# Patient Record
Sex: MALE | Race: WHITE | Employment: UNEMPLOYED | URBAN - METROPOLITAN AREA
[De-identification: names, ages, dates, MRNs, and addresses within clinical notes are randomized per-mention and may not be internally consistent; named-entity substitution may affect disease eponyms.]

---

## 2018-04-26 ENCOUNTER — OFFICE VISIT (OUTPATIENT)
Dept: FAMILY MEDICINE CLINIC | Age: 53
End: 2018-04-26

## 2018-04-26 VITALS
RESPIRATION RATE: 16 BRPM | SYSTOLIC BLOOD PRESSURE: 130 MMHG | BODY MASS INDEX: 28.17 KG/M2 | DIASTOLIC BLOOD PRESSURE: 76 MMHG | TEMPERATURE: 97.4 F | OXYGEN SATURATION: 98 % | WEIGHT: 208 LBS | HEART RATE: 91 BPM | HEIGHT: 72 IN

## 2018-04-26 DIAGNOSIS — L90.5 SCAR OF FOOT: ICD-10-CM

## 2018-04-26 DIAGNOSIS — Z76.89 ENCOUNTER TO ESTABLISH CARE: Primary | ICD-10-CM

## 2018-04-26 DIAGNOSIS — Z00.00 LABORATORY EXAM ORDERED AS PART OF ROUTINE GENERAL MEDICAL EXAMINATION: ICD-10-CM

## 2018-04-26 RX ORDER — TRIAMCINOLONE ACETONIDE 55 UG/1
1 SPRAY, METERED NASAL
COMMUNITY
End: 2022-10-06 | Stop reason: ALTCHOICE

## 2018-04-26 NOTE — PROGRESS NOTES
S: Berkley Hunt is a 47 y.o. male who presents for skin/rash    Assessment/Plan:  1. Encounter to establish care  -will schedule CPE, labs ordered today: lipid, CBC w/diff, CMP, A1c, U/A    2. 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 - had infection at site in December, given abx and cleared upl but has 3 areas of hyperpigmentation   -does have LE edema and wears compression sock daily   -will  pull lab work - CBC w/diff, A1c, VIt D, CMP, U/A - pt to schedule CPE to discuss results    RTC for CPE, discuss lab results       HPI:  1990 - Had giant cell - size of golf ball removed from left ankle  2014 - had a blood clot in left leg from hip to knee - Surgery done [place], [place]  Has restless leg syndrome from that surgery     Old scar was infected in December 2017 - given abx clindamycin 300mg bid x 10days  Infection cleared up   No pain, no itching  Skin discolored - pt concerned about hyperpigmentation as sign of poor healing  No Bites:     Has DM in family - discussed reduced healing in DM  Pt states he does test his BS at home  -100-155 after meals     Social History:  Social: Moved form [place] in January 2018 - mom has dementia and he moved here to take care of her  Occupation: [employer]   Tobacco: none      Review of Systems:  - Constitutional Symptoms: no fatigue  - Cardiovascular: no chest pain or palpitations  - Respiratory: no cough or shortness of breath  - Gastrointestinal: no dysphagia or abdominal pain    I reviewed the following:  History reviewed. No pertinent past medical history. Current Outpatient Prescriptions   Medication Sig Dispense Refill    triamcinolone (NASACORT AQ) 55 mcg nasal inhaler 1 Spray.  acetaminophen-caff-pyrilamine 500-60-15 mg tab Take  by mouth.          Allergies   Allergen Reactions    Amitriptyline Rash    Levofloxacin Shortness of Breath and Swelling    Mold Itching         O: VS:   Visit Vitals    /76 (BP 1 Location: Left arm, BP Patient Position: Sitting)    Pulse 91    Temp 97.4 °F (36.3 °C) (Oral)    Resp 16    Ht 6' (1.829 m)    Wt 208 lb (94.3 kg)    SpO2 98%    BMI 28.21 kg/m2       GENERAL: Rosa Wilkes is in no acute distress. Non-toxic. Well nourished. Well developed. Appropriately groomed. HEAD:  Normocephalic. Atraumatic. Non tender sinuses x 4. RESP: Breath sounds are symmetrical bilaterally. Unlabored without SOB. Speaking in full sentences. Clear to auscultation bilaterally anteriorly and posteriorly. No wheezes. No rales or rhonchi. CV: normal rate. Regular rhythm. S1, S2 audible. No murmur noted. No rubs, clicks or gallops noted. SKIN: Skin is warm and dry. Turgor is normal. No cyanosis. No jaundice or pallor. Left Ankle: hyperpigmentation of scarred area noted, no discharge, fluctuance or edema or erythema noted. PSYCH: appropriate behavior, dress and thought processes. Good eye contact. Clear and coherent speech. Full affect. Good insight.         ______________________________________________________________________  Patient education was done. Advised on nutrition, tobacco, and alcohol. Counseling included discussion of diagnosis, differentials, treatment options, prescribed treatment, warning signs and follow up. Medication risks/benefits, interactions and alternatives discussed with patient.      Patient verbalized understanding and agreed to plan of care.     Follow up - schedule CPE and discuss labs

## 2018-04-26 NOTE — PROGRESS NOTES
Sarah Kin  Identified pt with two pt identifiers(name and ). Chief Complaint   Patient presents with    New Patient     Establish Patient        1. Have you been to the ER, urgent care clinic since your last visit? Hospitalized since your last visit? NO    2. Have you seen or consulted any other health care providers outside of the [address] [place] [address] since your last visit? Include any pap smears or colon screening. NO    Today's provider has been notified of reason for visit, vitals and flowsheets obtained on patients. Patient received paperwork for advance directive during previous visit but has not completed at this time     Reviewed record In preparation for visit, huddled with provider and have obtained necessary documentation      Health Maintenance Due   Topic    DTaP/Tdap/Td series (1 - Tdap)    FOBT Q 1 YEAR AGE 54-65     Influenza Age 5 to Adult        Wt Readings from Last 3 Encounters:   18 208 lb (94.3 kg)     Temp Readings from Last 3 Encounters:   18 97.4 °F (36.3 °C) (Oral)     BP Readings from Last 3 Encounters:   18 130/76     Pulse Readings from Last 3 Encounters:   18 91     Vitals:    18 1518   BP: 130/76   Pulse: 91   Resp: 16   Temp: 97.4 °F (36.3 °C)   TempSrc: Oral   SpO2: 98%   Weight: 208 lb (94.3 kg)   Height: 6' (1.829 m)   PainSc:   0 - No pain         Learning Assessment:  :     Learning Assessment 2018   PRIMARY LEARNER Patient   HIGHEST LEVEL OF EDUCATION - PRIMARY LEARNER  SOME COLLEGE   PRIMARY LANGUAGE ENGLISH   LEARNER PREFERENCE PRIMARY READING   ANSWERED BY self   RELATIONSHIP SELF       Depression Screening:  :     PHQ over the last two weeks 2018   Little interest or pleasure in doing things Not at all   Feeling down, depressed or hopeless Not at all   Total Score PHQ 2 0       Fall Risk Assessment:  :     No flowsheet data found. Abuse Screening:  :     No flowsheet data found.     ADL Screening:  :     ADL Assessment 4/26/2018   Feeding yourself No Help Needed   Getting from bed to chair No Help Needed   Getting dressed No Help Needed   Bathing or showering No Help Needed   Walk across the room (includes cane/walker) No Help Needed   Using the telphone No Help Needed   Taking your medications No Help Needed   Preparing meals No Help Needed   Managing money (expenses/bills) No Help Needed   Moderately strenuous housework (laundry) No Help Needed   Shopping for personal items (toiletries/medicines) No Help Needed   Shopping for groceries No Help Needed   Driving No Help Needed   Climbing a flight of stairs No Help Needed   Getting to places beyond walking distances No Help Needed                 Medication reconciliation up to date and corrected with patient at this time.

## 2018-04-26 NOTE — MR AVS SNAPSHOT
[address] [place] 
[address] [place] [address] 
[phone] Patient: Lucrecia Lopez MRN: [id number] :1965 Visit Information Date & Time Provider Department Dept. Phone Encounter #  
 2018  3:40 PM Luke Giang NP [facility] [phone] [id number] Upcoming Health Maintenance Date Due DTaP/Tdap/Td series (1 - Tdap) 10/17/1987 FOBT Q 1 YEAR AGE 50-75 10/17/2016 Influenza Age 5 to Adult 2017 Allergies as of 2018  Review Complete On: 2018 By: Luke Giang NP Severity Noted Reaction Type Reactions Amitriptyline  2017    Rash Levofloxacin  2017    Shortness of Breath, Swelling Mold  2018    Itching Current Immunizations  Never Reviewed No immunizations on file. Not reviewed this visit You Were Diagnosed With   
  
 Codes Comments Laboratory exam ordered as part of routine general medical examination    -  Primary ICD-10-CM: Z00.00 ICD-9-CM: V72.62 Encounter to establish care     ICD-10-CM: Z76.89 
ICD-9-CM: V65.8 Vitals BP Pulse Temp Resp Height(growth percentile) Weight(growth percentile) 130/76 (BP 1 Location: Left arm, BP Patient Position: Sitting) 91 97.4 °F (36.3 °C) (Oral) 16 6' (1.829 m) 208 lb (94.3 kg) SpO2 BMI Smoking Status 98% 28.21 kg/m2 Never Smoker BMI and BSA Data Body Mass Index Body Surface Area  
 28.21 kg/m 2 2.19 m 2 Preferred Pharmacy Pharmacy Name Phone [phone] [phone] Your Updated Medication List  
  
   
This list is accurate as of 18  4:08 PM.  Always use your most recent med list.  
  
  
  
  
 acetaminophen-caff-pyrilamine 500-60-15 mg Tab Take  by mouth.  
  
 triamcinolone 55 mcg nasal inhaler Commonly known as:  NASACORT AQ  
1 [place]. We Performed the Following CBC WITH AUTOMATED DIFF [27580 CPT(R)] HEMOGLOBIN A1C WITH EAG [10828 CPT(R)] LIPID PANEL [57176 CPT(R)] METABOLIC PANEL, COMPREHENSIVE [01991 CPT(R)] UA/M W/RFLX CULTURE, ROUTINE [[id number] Custom] Patient Instructions 1) Labs The following blood work was ordered today. Once the tests are completed, you will receive a letter, email or phone call with the results. If you have not heard from us within 10-14 days, please call the office for the results. Complete Blood Count (CBC) helps evaluate your overall health, including hemoglobin and red blood cells that carry oxygen, white blood cells that fight infection and platelets that help with clotting. Complete Metabolic Panel (CMP) assesses electrolytes, kidney and liver function.  (A Basic Metabolic Panel (BMP) does not include liver function.) Electrolytes include sodium, potassium, calcium, and chloride. These are necessary for cell function and an imbalance can cause serious problems. BUN and creatinine are markers of kidney function. ALT and AST are markers of liver function. Hemoglobin A1c is a 3 month average of your blood sugar and used as a marker of your diabetes control. A normal value is less than 5.7%. Total Cholesterol is the total number of cholesterol particles in your blood, which includes triglycerides, HDL and LDL. A small amount of cholesterol is needed for the body's cells, hormones, and metabolism. Goal is less than 200. Triglycerides are the short term fats in your blood which are used for energy. Goal is less than 150. High Density Lipids (HDL) is the \"good\" cholesterol in your blood. HDL helps remove bad cholesterol from your blood. Goal is more than 40. Low Density Lipids (LDL) is the \"bad\" cholesterol in your blood. LDL can stick to your arteries, raising the risk for heart attack and stroke. Goal is less than 100 Urinalysis - this is a test of your urine to check your overall health. It is recommended as a part of a routine check up and screens for a variety of disorders, such as urinary tract infections, kidney disease and diabetes. 2) Please check your blood pressure through the week at staggered times in the day. (If you check in the morning, it should be at least one hour after your morning blood pressure medications.)  Arm monitors are most accurate. If you use a wrist monitor, make sure your wrist is at heart level. You can bring your home monitor to your next visit and have it calibrated with the machine in the office to gauge your readings. Keep a written record of your blood pressure readings and bring it to each appointment. If your systolic blood pressure is consistently greater than 140mmHg or less than 100mmHg then please schedule an office meeting. Cardiac symptoms that would need immediate attention include: uncomfortable pressure, squeezing, fullness or pain in the center of your chest. Pain or discomfort in one or both arms, the back, neck, jaw or stomach. Shortness of breath with or without chest discomfort, breaking out in a cold sweat, nausea or lightheadedness. Introducing [facility]! [employer] introduces [employer] patient portal. Now you can access parts of your medical record, email your doctor's office, and request medication refills online. 1. In your internet browser, go to [email or web address] 2. Click on the First Time User? Click Here link in the Sign In box. You will see the New Member Sign Up page. 3. Enter your [employer] Access Code exactly as it appears below. You will not need to use this code after youve completed the sign-up process. If you do not sign up before the expiration date, you must request a new code. · [employer] Access Code: [email or web address] Expires: 7/25/2018  4:08 PM 
 
 4. Enter the last four digits of your Social Security Number (xxxx) and Date of Birth (mm/dd/yyyy) as indicated and click Submit. You will be taken to the next sign-up page. 5. Create a [employer] ID. This will be your [employer] login ID and cannot be changed, so think of one that is secure and easy to remember. 6. Create a [employer] password. You can change your password at any time. 7. Enter your Password Reset Question and Answer. This can be used at a later time if you forget your password. 8. Enter your e-mail address. You will receive e-mail notification when new information is available in [address] 9. Click Sign Up. You can now view and download portions of your medical record. 10. Click the Download Summary menu link to download a portable copy of your medical information. If you have questions, please visit the Frequently Asked Questions section of the [employer] website. Remember, [employer] is NOT to be used for urgent needs. For medical emergencies, dial 911. Now available from your iPhone and Android! Please provide this summary of care documentation to your next provider. If you have any questions after today's visit, please call [phone].

## 2018-04-26 NOTE — PATIENT INSTRUCTIONS
1) Labs  The following blood work was ordered today. Once the tests are completed, you will receive a letter, email or phone call with the results. If you have not heard from us within 10-14 days, please call the office for the results. Complete Blood Count (CBC) helps evaluate your overall health, including hemoglobin and red blood cells that carry oxygen, white blood cells that fight infection and platelets that help with clotting. Complete Metabolic Panel (CMP) assesses electrolytes, kidney and liver function.  (A Basic Metabolic Panel (BMP) does not include liver function.)  Electrolytes include sodium, potassium, calcium, and chloride. These are necessary for cell function and an imbalance can cause serious problems. BUN and creatinine are markers of kidney function. ALT and AST are markers of liver function. Hemoglobin A1c is a 3 month average of your blood sugar and used as a marker of your diabetes control. A normal value is less than 5.7%. Total Cholesterol is the total number of cholesterol particles in your blood, which includes triglycerides, HDL and LDL. A small amount of cholesterol is needed for the body's cells, hormones, and metabolism. Goal is less than 200. Triglycerides are the short term fats in your blood which are used for energy. Goal is less than 150. High Density Lipids (HDL) is the \"good\" cholesterol in your blood. HDL helps remove bad cholesterol from your blood. Goal is more than 40. Low Density Lipids (LDL) is the \"bad\" cholesterol in your blood. LDL can stick to your arteries, raising the risk for heart attack and stroke. Goal is less than 100    Urinalysis - this is a test of your urine to check your overall health. It is recommended as a part of a routine check up and screens for a variety of disorders, such as urinary tract infections, kidney disease and diabetes.      2) Please check your blood pressure through the week at staggered times in the day. (If you check in the morning, it should be at least one hour after your morning blood pressure medications.)  Arm monitors are most accurate. If you use a wrist monitor, make sure your wrist is at heart level. You can bring your home monitor to your next visit and have it calibrated with the machine in the office to gauge your readings. Keep a written record of your blood pressure readings and bring it to each appointment. If your systolic blood pressure is consistently greater than 140mmHg or less than 100mmHg then please schedule an office meeting. Cardiac symptoms that would need immediate attention include: uncomfortable pressure, squeezing, fullness or pain in the center of your chest. Pain or discomfort in one or both arms, the back, neck, jaw or stomach. Shortness of breath with or without chest discomfort, breaking out in a cold sweat, nausea or lightheadedness.

## 2018-05-04 LAB
ALBUMIN SERPL-MCNC: 4.1 G/DL (ref 3.5–5.5)
ALBUMIN/GLOB SERPL: 1.7 {RATIO} (ref 1.2–2.2)
ALP SERPL-CCNC: 95 IU/L (ref 39–117)
ALT SERPL-CCNC: 28 IU/L (ref 0–44)
APPEARANCE UR: CLEAR
AST SERPL-CCNC: 22 IU/L (ref 0–40)
BACTERIA #/AREA URNS HPF: NORMAL /[HPF]
BASOPHILS # BLD AUTO: 0.1 X10E3/UL (ref 0–0.2)
BASOPHILS NFR BLD AUTO: 2 %
BILIRUB SERPL-MCNC: 0.5 MG/DL (ref 0–1.2)
BILIRUB UR QL STRIP: NEGATIVE
BUN SERPL-MCNC: 20 MG/DL (ref 6–24)
BUN/CREAT SERPL: 20 (ref 9–20)
CALCIUM SERPL-MCNC: 9.3 MG/DL (ref 8.7–10.2)
CASTS URNS QL MICRO: NORMAL /LPF
CHLORIDE SERPL-SCNC: 104 MMOL/L (ref 96–106)
CHOLEST SERPL-MCNC: 188 MG/DL (ref 100–199)
CO2 SERPL-SCNC: 26 MMOL/L (ref 18–29)
COLOR UR: YELLOW
CREAT SERPL-MCNC: 0.98 MG/DL (ref 0.76–1.27)
EOSINOPHIL # BLD AUTO: 0.2 X10E3/UL (ref 0–0.4)
EOSINOPHIL NFR BLD AUTO: 6 %
EPI CELLS #/AREA URNS HPF: NORMAL /HPF
ERYTHROCYTE [DISTWIDTH] IN BLOOD BY AUTOMATED COUNT: 13.3 % (ref 12.3–15.4)
EST. AVERAGE GLUCOSE BLD GHB EST-MCNC: 120 MG/DL
GFR SERPLBLD CREATININE-BSD FMLA CKD-EPI: 103 ML/MIN/1.73
GFR SERPLBLD CREATININE-BSD FMLA CKD-EPI: 89 ML/MIN/1.73
GLOBULIN SER CALC-MCNC: 2.4 G/DL (ref 1.5–4.5)
GLUCOSE SERPL-MCNC: 114 MG/DL (ref 65–99)
GLUCOSE UR QL: NEGATIVE
HBA1C MFR BLD: 5.8 % (ref 4.8–5.6)
HCT VFR BLD AUTO: 46.8 % (ref 37.5–51)
HDLC SERPL-MCNC: 40 MG/DL
HGB BLD-MCNC: 15.2 G/DL (ref 13–17.7)
HGB UR QL STRIP: NEGATIVE
IMM GRANULOCYTES # BLD: 0 X10E3/UL (ref 0–0.1)
IMM GRANULOCYTES NFR BLD: 0 %
INTERPRETATION, 910389: NORMAL
KETONES UR QL STRIP: NEGATIVE
LDLC SERPL CALC-MCNC: 127 MG/DL (ref 0–99)
LEUKOCYTE ESTERASE UR QL STRIP: NEGATIVE
LYMPHOCYTES # BLD AUTO: 1.3 X10E3/UL (ref 0.7–3.1)
LYMPHOCYTES NFR BLD AUTO: 38 %
MCH RBC QN AUTO: 28.5 PG (ref 26.6–33)
MCHC RBC AUTO-ENTMCNC: 32.5 G/DL (ref 31.5–35.7)
MCV RBC AUTO: 88 FL (ref 79–97)
MICRO URNS: NORMAL
MICRO URNS: NORMAL
MONOCYTES # BLD AUTO: 0.3 X10E3/UL (ref 0.1–0.9)
MONOCYTES NFR BLD AUTO: 8 %
MUCOUS THREADS URNS QL MICRO: PRESENT
NEUTROPHILS # BLD AUTO: 1.6 X10E3/UL (ref 1.4–7)
NEUTROPHILS NFR BLD AUTO: 46 %
NITRITE UR QL STRIP: NEGATIVE
PH UR STRIP: 5.5 [PH] (ref 5–7.5)
PLATELET # BLD AUTO: 182 X10E3/UL (ref 150–379)
POTASSIUM SERPL-SCNC: 4.3 MMOL/L (ref 3.5–5.2)
PROT SERPL-MCNC: 6.5 G/DL (ref 6–8.5)
PROT UR QL STRIP: NEGATIVE
RBC # BLD AUTO: 5.34 X10E6/UL (ref 4.14–5.8)
RBC #/AREA URNS HPF: NORMAL /HPF
SODIUM SERPL-SCNC: 142 MMOL/L (ref 134–144)
SP GR UR: 1.02 (ref 1–1.03)
TRIGL SERPL-MCNC: 106 MG/DL (ref 0–149)
URINALYSIS REFLEX, 377202: NORMAL
UROBILINOGEN UR STRIP-MCNC: 0.2 MG/DL (ref 0.2–1)
VLDLC SERPL CALC-MCNC: 21 MG/DL (ref 5–40)
WBC # BLD AUTO: 3.5 X10E3/UL (ref 3.4–10.8)
WBC #/AREA URNS HPF: NORMAL /HPF

## 2018-05-07 ENCOUNTER — TELEPHONE (OUTPATIENT)
Dept: FAMILY MEDICINE CLINIC | Age: 53
End: 2018-05-07

## 2018-05-07 NOTE — TELEPHONE ENCOUNTER
Spoke with patient after obtaining 2 patient identifiers  Patient made aware of lab results. Verbalized understanding with no questions noted at this time.

## 2018-05-08 ENCOUNTER — TELEPHONE (OUTPATIENT)
Dept: FAMILY MEDICINE CLINIC | Age: 53
End: 2018-05-08

## 2018-05-12 DIAGNOSIS — Z00.00 LABORATORY EXAM ORDERED AS PART OF ROUTINE GENERAL MEDICAL EXAMINATION: ICD-10-CM

## 2018-05-14 DIAGNOSIS — G43.909 MIGRAINE WITHOUT STATUS MIGRAINOSUS, NOT INTRACTABLE, UNSPECIFIED MIGRAINE TYPE: Primary | ICD-10-CM

## 2018-05-14 PROBLEM — G25.81 RESTLESS LEG SYNDROME: Status: ACTIVE | Noted: 2018-05-14

## 2018-05-14 PROBLEM — G43.009 MIGRAINE WITHOUT AURA AND WITHOUT STATUS MIGRAINOSUS, NOT INTRACTABLE: Status: ACTIVE | Noted: 2018-05-14

## 2018-05-16 ENCOUNTER — OFFICE VISIT (OUTPATIENT)
Dept: FAMILY MEDICINE CLINIC | Age: 53
End: 2018-05-16

## 2018-05-16 VITALS
BODY MASS INDEX: 27.4 KG/M2 | OXYGEN SATURATION: 97 % | TEMPERATURE: 97.6 F | HEART RATE: 79 BPM | WEIGHT: 202.3 LBS | HEIGHT: 72 IN | SYSTOLIC BLOOD PRESSURE: 117 MMHG | RESPIRATION RATE: 18 BRPM | DIASTOLIC BLOOD PRESSURE: 73 MMHG

## 2018-05-16 DIAGNOSIS — G43.909 MIGRAINE WITHOUT STATUS MIGRAINOSUS, NOT INTRACTABLE, UNSPECIFIED MIGRAINE TYPE: Primary | ICD-10-CM

## 2018-05-16 DIAGNOSIS — R41.840 POOR CONCENTRATION: ICD-10-CM

## 2018-05-16 DIAGNOSIS — R41.0 DISORIENTATED: ICD-10-CM

## 2018-05-16 DIAGNOSIS — Z79.899 CONTROLLED SUBSTANCE AGREEMENT SIGNED: ICD-10-CM

## 2018-05-16 NOTE — LETTER
5/16/2018 12:58 PM 
 
Mr. Agus De Los Santos [address] [facility] [address] Re: Agus De Los Santos To Whom It May Concern: 
 
Agus De Los Santos  is under my care for a medical issue. At this time, he is safe to return to work. However, he has been given instructions for a modified diet, including more frequent intake of food, and treatment monitoring, so please allow him time to accomplish these tasks. Additionally, due to symptoms he has been experiencing, it would be best if Mr. Shreya Maya is able to work in a climate controlled environment. Thank you for your assistance in this matter. If there are questions or concerns, please have the patient contact our office. Sincerely, Manjeet Henson NP

## 2018-05-16 NOTE — PROGRESS NOTES
Pt states he thinks he is getting problems with being out in the heat. States he\" gets dizzy and feels hotter than what the temp is outside. \"states he\" feels very thirsty and fatigue. \"cancer. 1. Have you been to the ER, urgent care clinic since your last visit? Hospitalized since your last visit? No    2. Have you seen or consulted any other health care providers outside of the [address] [place] [address] since your last visit? Include any pap smears or colon screening.  No

## 2018-05-16 NOTE — PATIENT INSTRUCTIONS
1) Fair Life milk is a healthy choice in milk products. It is double filtered to remove much of the lactose (sugar found in milk) and recommended by trainers and nutritionists. There is 13 g of protein in a serving, so it is an easy way to increase your protein and calcium intake. 2) Monitor your blood sugar and blood pressure if you get symptoms of lightheaded, dizziness, mental fogginess. Please increase intake of water to 80oz daily in hot weather, more if you are working outside in heat. If you see \"sparkles\" drink a sports drink to help replenish minerals that help you to stay hydrated. Dehydration: Care Instructions  Your Care Instructions  Dehydration happens when your body loses too much fluid. This might happen when you do not drink enough water or you lose large amounts of fluids from your body because of diarrhea, vomiting, or sweating. Severe dehydration can be life-threatening. Water and minerals called electrolytes help put your body fluids back in balance. Learn the early signs of fluid loss, and drink more fluids to prevent dehydration. Follow-up care is a key part of your treatment and safety. Be sure to make and go to all appointments, and call your doctor if you are having problems. It's also a good idea to know your test results and keep a list of the medicines you take. How can you care for yourself at home? · To prevent dehydration, drink plenty of fluids, enough so that your urine is light yellow or clear like water. Choose water and other caffeine-free clear liquids until you feel better. If you have kidney, heart, or liver disease and have to limit fluids, talk with your doctor before you increase the amount of fluids you drink. · If you do not feel like eating or drinking, try taking small sips of water, sports drinks, or other rehydration drinks.   · Get plenty of rest.  To prevent dehydration  · Add more fluids to your diet and daily routine, unless your doctor has told you not to. · During hot weather, drink more fluids. Drink even more fluids if you exercise a lot. Stay away from drinks with alcohol or caffeine. · Watch for the symptoms of dehydration. These include:  ¨ A dry, sticky mouth. ¨ Dark yellow urine, and not much of it. ¨ Dry and sunken eyes. ¨ Feeling very tired. · Learn what problems can lead to dehydration. These include:  ¨ Diarrhea, fever, and vomiting. ¨ Any illness with a fever, such as pneumonia or the flu. ¨ Activities that cause heavy sweating, such as endurance races and heavy outdoor work in hot or humid weather. ¨ Alcohol or drug abuse or withdrawal.  ¨ Certain medicines, such as cold and allergy pills (antihistamines), diet pills (diuretics), and laxatives. ¨ Certain diseases, such as diabetes, cancer, and heart or kidney disease. When should you call for help? Call 911 anytime you think you may need emergency care. For example, call if:  ? · You passed out (lost consciousness). ?Call your doctor now or seek immediate medical care if:  ? · You are confused and cannot think clearly. ? · You are dizzy or lightheaded, or you feel like you may faint. ? · You have signs of needing more fluids. You have sunken eyes and a dry mouth, and you pass only a little dark urine. ? · You cannot keep fluids down. ? Watch closely for changes in your health, and be sure to contact your doctor if:  ? · You are not making tears. ? · Your skin is very dry and sags slowly back into place after you pinch it. ? · Your mouth and eyes are very dry. Where can you learn more? Go to http://eladia-meaghan.info/. Enter X779 in the search box to learn more about \"Dehydration: Care Instructions. \"  Current as of: March 20, 2017  Content Version: 11.4  © 0263-0436 [employer]. Care instructions adapted under license by [employer] (which disclaims liability or warranty for this information).  If you have questions about a medical condition or this instruction, always ask your healthcare professional. [place] [address] any warranty or liability for your use of this information. Hypoglycemia: Care Instructions  Your Care Instructions    Hypoglycemia means that your blood sugar is low and your body is not getting enough fuel. Some people get low blood sugar from not eating often enough. Some medicines to treat diabetes can cause low blood sugar. People who have had surgery on their stomachs or intestines may get hypoglycemia. Problems with the pancreas, kidneys, or liver also can cause low blood sugar. A snack or drink with sugar in it will raise your blood sugar and should ease your symptoms right away. Your doctor may recommend that you change or stop your medicines until you can get your blood sugar levels under control. In the long run, you may need to change your diet and eating habits so that you get enough fuel for your body throughout the day. Follow-up care is a key part of your treatment and safety. Be sure to make and go to all appointments, and call your doctor if you are having problems. It's also a good idea to know your test results and keep a list of the medicines you take. How can you care for yourself at home? · Learn to recognize the early signs of low blood sugar. Signs include:  ¨ Nausea. ¨ Hunger. ¨ Feeling nervous, irritable, or shaky. ¨ Cold, clammy, wet skin. ¨ Sweating (when you are not exercising). ¨ A fast heartbeat. ¨ Numbness or tingling of the fingertips or lips. · If you feel an episode of low blood sugar coming on, drink fruit juice or sugared (not diet) soda, or eat sugar in the form of candy, cubes, or tablets. [employer] are another [employer]. · Eat small, frequent meals so that you do not get too hungry between meals. · Balance extra exercise with eating more.   · Keep a written record of your low blood sugar episodes, including when you last ate and what you ate, so that you can learn what causes your blood sugar to drop. · Make sure your family, friends, and coworkers know the symptoms of low blood sugar and know what to do to get your sugar level up. · Wear medical alert jewelry that lists your condition. You can buy this at most drugstores. When should you call for help? Call 911 anytime you think you may need emergency care. For example, call if:  ? · You passed out (lost consciousness). ? · You are confused or cannot think clearly. ? · Your blood sugar is very high or very low. ? Watch closely for changes in your health, and be sure to contact your doctor if:  ? · Your blood sugar stays outside the level your doctor set for you. ? · You have any problems. Where can you learn more? Go to http://eladia-meaghan.info/. Enter D554 in the search box to learn more about \"Hypoglycemia: Care Instructions. \"  Current as of: March 13, 2017  Content Version: 11.4  © 7129-7108 [employer]. Care instructions adapted under license by [employer] (which disclaims liability or warranty for this information). If you have questions about a medical condition or this instruction, always ask your healthcare professional. [address] any warranty or liability for your use of this information.

## 2018-05-16 NOTE — PROGRESS NOTES
S: Eleazar Llamas is a 47 y.o. male who presents for medication check and heat issue    Assessment/Plan:  1. Incident of poor concentration, disoriented at work  -discussed s/s of hypoglycemia, hypotension, dehydration  -advised to take BS and BP if he is experiencing sx; increase water intake to 80oz daily   -best to work in climate controlled area instead of outside in heat, if possible    -work note given for return to work and modifications for [employer]  -pt to sign up for [address] and email me if another episode occurs    2. Migraine without status migrainosus, not intractable, unspecified migraine type  -refill on sdfjclb-klyqcpgmz-paymcpibqdsi (MIDRIN) -325 mg #30/2  -CS and drug urine screen today           Requesting refills for:  isometheptene mucate, dichloralphenazone and acetaminophen (midrin)- uses for migraines; takes prn -   usually #90 will last him for the year   Controls migraines well - triggers include caffeine  CS signed today  Urine drug screen today   checked: Reviewed patient's prescription monitoring report at time of visit and there are no discrepancies.      Overheating at work   Working as  - so can be outside in heat  Having heat exhaustion - really hot, disoriented, couldn't focus (around 4:30pm was putting on a trailer hitch on a car and couldn't focus on what   Had eaten lunch, but no protein - was vegetarian meal - 4 hours earlier; drinking a lot of water 5-7 of 12 oz bottles   When incident occurred - working outside in 88-90 degree weather; felt like there was good airflow   Has lost 6 lbs since last OV 4/28/18 - not sure why     Soft BM - a bit loose   Urine ok - 2-3x day  No HA, just confused feeling  BP = 117/73 - thinks BP was elevated at last OV bc he had just had sugary soda before visit (no caffeine)    Social history:   Nutrition: has started eating low carb d/t A1c 5.8%  Occupation:    Tobacco: none    Review of Systems:  - Constitutional Symptoms: no fevers, chills  - Cardiovascular: no chest pain or palpitations  - Respiratory: no cough or shortness of breath  - Neurological: no numbness, tingling, or headaches  - Endocrine:  no heat or cold intolerance, no polyuria or polydipsia  PHQ over the last two weeks 4/26/2018   Little interest or pleasure in doing things Not at all   Feeling down, depressed or hopeless Not at all   Total Score PHQ 2 0       I reviewed the following:  Current Outpatient Prescriptions   Medication Sig Dispense Refill    triamcinolone (NASACORT AQ) 55 mcg nasal inhaler 1 Spray.  gvolgxz-zhasezxcv-hsnfzlzlbbrw (MIDRIN) -325 mg capsule 2 tabs po, followed by 1 tab po every hour until relief is obtained, not to exceed 5 tabs in 12 hours  Indications: Migraine, TENSION-TYPE HEADACHE 30 Cap 2       Past Medical History:   Diagnosis Date    Blood clot associated with vein wall inflammation 2014    Giant cell tumor     leg       Allergies   Allergen Reactions    Amitriptyline Rash    Levofloxacin Shortness of Breath and Swelling    Mold Itching       O: VS:   Visit Vitals    /73 (BP 1 Location: Right arm, BP Patient Position: Sitting)    Pulse 79    Temp 97.6 °F (36.4 °C) (Oral)    Resp 18    Ht 6' (1.829 m)    Wt 202 lb 4.8 oz (91.8 kg)    SpO2 97%    BMI 27.44 kg/m2        GENERAL: Robert Marlow is in no acute distress. Non-toxic. Well nourished. Well developed. Appropriately groomed. HEAD:  Normocephalic. Atraumatic. Non tender sinuses x 4. EYE: PERRLA. EOMs intact. Sclera anicteric without injection. No drainage or discharge. EARS: Hearing intact bilaterally. External ear canals normal without evidence of blood or swelling. Bilateral TM's intact, pearly grey with landmarks visible. No erythema or effusion. NOSE: Patent. Nasal turbinates pink. No erythema. No discharge.     MOUTH: mucous membranes pink and moist. Posterior pharynx normal with cobblestone appearance. No erythema, white exudate or obstruction. NECK: supple. Midline trachea. No cervical adenopathy noted. RESP: Breath sounds are symmetrical bilaterally. Unlabored without SOB. Speaking in full sentences. Clear to auscultation bilaterally anteriorly and posteriorly. No wheezes. No rales or rhonchi. CV: normal rate. Regular rhythm. S1, S2 audible. No murmur noted. No rubs, clicks or gallops noted. NEURO:  awake, alert and oriented to person, place, and time and event. Cranial nerves II through XII intact. Clear speech. Muscle strength is +5/5 x 4 extremities. Sensation is intact to light touch bilaterally. Steady gait. PSYCH: appropriate behavior, dress and thought processes. Good eye contact. Clear and coherent speech. Full affect. Good insight.   _______________________________________________________________  Patient education was done. Advised on nutrition, physical activity, hydration, low blood sugar and safety). Counseling included discussion of diagnosis, differentials, treatment options, prescribed treatment, warning signs and follow up. Medication risks/benefits,interactions and alternatives discussed with patient.      Patient verbalized understanding and agreed to plan of care. Patient was given an after visit summary which included current diagnoses, medications and vital signs. Follow up as directed.

## 2018-05-16 NOTE — MR AVS SNAPSHOT
[facility] 
 
 
 [address] 
[address] [place] [address] 
[phone] Patient: Rosa Wilkes MRN: [id number] :1965 Visit Information Date & Time Provider Department Dept. Phone Encounter #  
 2018 12:00 PM Daron Connor NP [facility] [phone] [id number] Upcoming Health Maintenance Date Due DTaP/Tdap/Td series (1 - Tdap) 10/17/1987 FOBT Q 1 YEAR AGE 50-75 10/17/2016 Influenza Age 5 to Adult 2018 Allergies as of 2018  Review Complete On: 2018 By: David Jones LPN Severity Noted Reaction Type Reactions Amitriptyline  2017    Rash Levofloxacin  2017    Shortness of Breath, Swelling Mold  2018    Itching Current Immunizations  Reviewed on 2018 No immunizations on file. Not reviewed this visit You Were Diagnosed With   
  
 Codes Comments Migraine without status migrainosus, not intractable, unspecified migraine type     ICD-10-CM: G43.909 ICD-9-CM: 346.90 Vitals BP Pulse Temp Resp Height(growth percentile) Weight(growth percentile) 117/73 (BP 1 Location: Right arm, BP Patient Position: Sitting) 79 97.6 °F (36.4 °C) (Oral) 18 6' (1.829 m) 202 lb 4.8 oz (91.8 kg) SpO2 BMI Smoking Status 97% 27.44 kg/m2 Never Smoker Vitals History BMI and BSA Data Body Mass Index Body Surface Area  
 27.44 kg/m 2 2.16 m 2 Preferred Pharmacy Pharmacy Name Phone [id number] [address] [place] [phone] Your Updated Medication List  
  
   
This list is accurate as of 18  1:03 PM.  Always use your most recent med list.  
  
  
  
  
 htrgtaw-zdyhuhpmx-nlxzomyzgetq -325 mg capsule Commonly known as:  MIDRIN  
2 tabs po, followed by 1 tab po every hour until relief is obtained, not to exceed 5 tabs in 12 hours  Indications: Migraine, TENSION-TYPE HEADACHE  
  
 triamcinolone 55 mcg nasal inhaler Commonly known as:  NASACORT AQ  
1 [facility]. Prescriptions Printed Refills  
 wwhnviy-mltbfvhde-eurtinkhyvru (MIDRIN) -325 mg capsule 2 Si tabs po, followed by 1 tab po every hour until relief is obtained, not to exceed 5 tabs in 12 hours  Indications: Migraine, TENSION-TYPE HEADACHE Class: Print Patient Instructions 1) Fair Life milk is a healthy choice in milk products. It is double filtered to remove much of the lactose (sugar found in milk) and recommended by trainers and nutritionists. There is 13 g of protein in a serving, so it is an easy way to increase your protein and calcium intake. 2) Monitor your blood sugar and blood pressure if you get symptoms of lightheaded, dizziness, mental fogginess. Please increase intake of water to 80oz daily in hot weather, more if you are working outside in heat. If you see \"sparkles\" drink a sports drink to help replenish minerals that help you to stay hydrated. Dehydration: Care Instructions Your Care Instructions Dehydration happens when your body loses too much fluid. This might happen when you do not drink enough water or you lose large amounts of fluids from your body because of diarrhea, vomiting, or sweating. Severe dehydration can be life-threatening. Water and minerals called electrolytes help put your body fluids back in balance. Learn the early signs of fluid loss, and drink more fluids to prevent dehydration. Follow-up care is a key part of your treatment and safety. Be sure to make and go to all appointments, and call your doctor if you are having problems. It's also a good idea to know your test results and keep a list of the medicines you take. How can you care for yourself at home?  
· To prevent dehydration, drink plenty of fluids, enough so that your urine is light yellow or clear like water. Choose water and other caffeine-free clear liquids until you feel better. If you have kidney, heart, or liver disease and have to limit fluids, talk with your doctor before you increase the amount of fluids you drink. · If you do not feel like eating or drinking, try taking small sips of water, sports drinks, or other rehydration drinks. · Get plenty of rest. 
To prevent dehydration · Add more fluids to your diet and daily routine, unless your doctor has told you not to. · During hot weather, drink more fluids. Drink even more fluids if you exercise a lot. Stay away from drinks with alcohol or caffeine. · Watch for the symptoms of dehydration. These include: ¨ A dry, sticky mouth. ¨ Dark yellow urine, and not much of it. ¨ Dry and sunken eyes. ¨ Feeling very tired. · Learn what problems can lead to dehydration. These include: ¨ Diarrhea, fever, and vomiting. ¨ Any illness with a fever, such as pneumonia or the flu. ¨ Activities that cause heavy sweating, such as endurance races and heavy outdoor work in hot or humid weather. ¨ Alcohol or drug abuse or withdrawal. 
¨ Certain medicines, such as cold and allergy pills (antihistamines), diet pills (diuretics), and laxatives. ¨ Certain diseases, such as diabetes, cancer, and heart or kidney disease. When should you call for help? Call 911 anytime you think you may need emergency care. For example, call if: 
? · You passed out (lost consciousness). ?Call your doctor now or seek immediate medical care if: 
? · You are confused and cannot think clearly. ? · You are dizzy or lightheaded, or you feel like you may faint. ? · You have signs of needing more fluids. You have sunken eyes and a dry mouth, and you pass only a little dark urine. ? · You cannot keep fluids down. ? Watch closely for changes in your health, and be sure to contact your doctor if: 
? · You are not making tears. ? · Your skin is very dry and sags slowly back into place after you pinch it. ? · Your mouth and eyes are very dry. Where can you learn more? Go to http://eladia-meaghan.info/. Enter Z717 in the search box to learn more about \"Dehydration: Care Instructions. \" Current as of: March 20, 2017 Content Version: 11.4 © 7835-1142 [employer]. Care instructions adapted under license by [employer] (which disclaims liability or warranty for this information). If you have questions about a medical condition or this instruction, always ask your healthcare professional. [place] [address] any warranty or liability for your use of this information. Hypoglycemia: Care Instructions Your Care Instructions Hypoglycemia means that your blood sugar is low and your body is not getting enough fuel. Some people get low blood sugar from not eating often enough. Some medicines to treat diabetes can cause low blood sugar. People who have had surgery on their stomachs or intestines may get hypoglycemia. Problems with the pancreas, kidneys, or liver also can cause low blood sugar. A snack or drink with sugar in it will raise your blood sugar and should ease your symptoms right away. Your doctor may recommend that you change or stop your medicines until you can get your blood sugar levels under control. In the long run, you may need to change your diet and eating habits so that you get enough fuel for your body throughout the day. Follow-up care is a key part of your treatment and safety. Be sure to make and go to all appointments, and call your doctor if you are having problems. It's also a good idea to know your test results and keep a list of the medicines you take. How can you care for yourself at home? · Learn to recognize the early signs of low blood sugar. Signs include: 
¨ Nausea. ¨ Hunger. ¨ Feeling nervous, irritable, or shaky. ¨ Cold, clammy, wet skin. ¨ Sweating (when you are not exercising). ¨ A fast heartbeat. ¨ Numbness or tingling of the fingertips or lips. · If you feel an episode of low blood sugar coming on, drink fruit juice or sugared (not diet) soda, or eat sugar in the form of candy, cubes, or tablets. [employer] are another [employer]. · Eat small, frequent meals so that you do not get too hungry between meals. · Balance extra exercise with eating more. · Keep a written record of your low blood sugar episodes, including when you last ate and what you ate, so that you can learn what causes your blood sugar to drop. · Make sure your family, friends, and coworkers know the symptoms of low blood sugar and know what to do to get your sugar level up. · Wear medical alert jewelry that lists your condition. You can buy this at most drugstores. When should you call for help? Call 911 anytime you think you may need emergency care. For example, call if: 
? · You passed out (lost consciousness). ? · You are confused or cannot think clearly. ? · Your blood sugar is very high or very low. ? Watch closely for changes in your health, and be sure to contact your doctor if: 
? · Your blood sugar stays outside the level your doctor set for you. ? · You have any problems. Where can you learn more? Go to http://eladia-meaghan.info/. Enter Y360 in the search box to learn more about \"Hypoglycemia: Care Instructions. \" Current as of: March 13, 2017 Content Version: 11.4 © 3358-4906 [employer]. Care instructions adapted under license by [employer] (which disclaims liability or warranty for this information). If you have questions about a medical condition or this instruction, always ask your healthcare professional. [address] any warranty or liability for your use of this information. Introducing [facility] SERVICES! Radha Krishna introduces [employer] patient portal. Now you can access parts of your medical record, email your doctor's office, and request medication refills online. 1. In your internet browser, go to [email or web address] 2. Click on the First Time User? Click Here link in the Sign In box. You will see the New Member Sign Up page. 3. Enter your [employer] Access Code exactly as it appears below. You will not need to use this code after youve completed the sign-up process. If you do not sign up before the expiration date, you must request a new code. · [employer] Access Code: [email or web address] Expires: 7/25/2018  4:08 PM 
 
4. Enter the last four digits of your Social Security Number (xxxx) and Date of Birth (mm/dd/yyyy) as indicated and click Submit. You will be taken to the next sign-up page. 5. Create a [employer] ID. This will be your [employer] login ID and cannot be changed, so think of one that is secure and easy to remember. 6. Create a [employer] password. You can change your password at any time. 7. Enter your Password Reset Question and Answer. This can be used at a later time if you forget your password. 8. Enter your e-mail address. You will receive e-mail notification when new information is available in [address] 9. Click Sign Up. You can now view and download portions of your medical record. 10. Click the Download Summary menu link to download a portable copy of your medical information. If you have questions, please visit the Frequently Asked Questions section of the [employer] website. Remember, [employer] is NOT to be used for urgent needs. For medical emergencies, dial 911. Now available from your iPhone and Android! Please provide this summary of care documentation to your next provider. If you have any questions after today's visit, please call [phone].

## 2018-06-05 ENCOUNTER — OFFICE VISIT (OUTPATIENT)
Dept: FAMILY MEDICINE CLINIC | Age: 53
End: 2018-06-05

## 2018-06-05 VITALS
DIASTOLIC BLOOD PRESSURE: 82 MMHG | BODY MASS INDEX: 27.33 KG/M2 | SYSTOLIC BLOOD PRESSURE: 118 MMHG | HEIGHT: 72 IN | TEMPERATURE: 96.8 F | OXYGEN SATURATION: 97 % | RESPIRATION RATE: 20 BRPM | HEART RATE: 84 BPM | WEIGHT: 201.8 LBS

## 2018-06-05 DIAGNOSIS — R21 SKIN RASH: Primary | ICD-10-CM

## 2018-06-05 RX ORDER — NYSTATIN AND TRIAMCINOLONE ACETONIDE 100000; 1 [USP'U]/G; MG/G
CREAM TOPICAL 2 TIMES DAILY
Qty: 60 G | Refills: 1 | Status: SHIPPED | OUTPATIENT
Start: 2018-06-05 | End: 2022-07-06 | Stop reason: ALTCHOICE

## 2018-06-05 NOTE — PATIENT INSTRUCTIONS
1) Skin rash   Mycolog ointment - apply twice a day for 7-10 days or until rash goes away. Yeast Skin Infection: Care Instructions  Your Care Instructions    Yeast normally lives on your skin. Sometimes too much yeast can overgrow in certain areas of the skin and cause an infection. The infection causes red, scaly, moist patches on your skin that may itch. Common areas for skin yeast infections are skin folds under the breasts or belly area. The warm and moist areas in the skin folds can make it easier for yeast to overgrow. Yeast infections also can be found on other parts of the body such as the groin or armpits. You will probably get a cream or ointment that contains an antifungal medicine. Examples of these are miconazole and clotrimazole. You put it on your skin to treat the infection. Your doctor may give you a prescription for the cream or ointment. Or you may be able to buy it without a prescription at most drugstores. If the infection is severe, the doctor will prescribe antifungal pills. A yeast infection usually goes away after about a week of treatment. But it's important to use the medicine for as long as your doctor tells you to. Follow-up care is a key part of your treatment and safety. Be sure to make and go to all appointments, and call your doctor if you are having problems. It's also a good idea to know your test results and keep a list of the medicines you take. How can you care for yourself at home? · Be safe with medicines. Take your medicines exactly as prescribed. Call your doctor if you think you are having a problem with your medicine. · Keep your skin clean and dry. Your doctor may suggest using powder that contains an antifungal medicine in the skin folds. · Wear loose clothing. When should you call for help? Call your doctor now or seek immediate medical care if:  ? · You have symptoms of infection, such as:  ¨ Increased pain, swelling, warmth, or redness.   ¨ Red streaks leading from the area. ¨ Pus draining from the area. ¨ A fever. ? Watch closely for changes in your health, and be sure to contact your doctor if:  ? · You do not get better as expected. Where can you learn more? Go to http://eladia-meaghan.info/. Enter N124 in the search box to learn more about \"Yeast Skin Infection: Care Instructions. \"  Current as of: October 13, 2016  Content Version: 11.4  © 3803-2117 [employer]. Care instructions adapted under license by [employer] (which disclaims liability or warranty for this information). If you have questions about a medical condition or this instruction, always ask your healthcare professional. [address] any warranty or liability for your use of this information.

## 2018-06-05 NOTE — MR AVS SNAPSHOT
[address] [place] [address] 
[address] [facility] [address] 
[phone] Patient: Vidal Gonzalez MRN: [id number] :1965 Visit Information Date & Time Provider Department Dept. Phone Encounter #  
 2018 11:20 AM Robert Polo NP [facility] [phone] [id number] Upcoming Health Maintenance Date Due DTaP/Tdap/Td series (1 - Tdap) 10/17/1987 FOBT Q 1 YEAR AGE 50-75 10/17/2016 Influenza Age 5 to Adult 2018 Allergies as of 2018  Review Complete On: 2018 By: Robert Polo NP Severity Noted Reaction Type Reactions Amitriptyline  2017    Rash Levofloxacin  2017    Shortness of Breath, Swelling Mold  2018    Itching Current Immunizations  Reviewed on 2018 No immunizations on file. Reviewed by Robert Polo NP on 2018 at 11:45 AM  
You Were Diagnosed With   
  
 Codes Comments Skin rash    -  Primary ICD-10-CM: R21 
ICD-9-CM: 782.1 Vitals BP Pulse Temp Resp Height(growth percentile) Weight(growth percentile) 118/82 (BP 1 Location: Left arm, BP Patient Position: Sitting) 84 96.8 °F (36 °C) (Oral) 20 6' (1.829 m) 201 lb 12.8 oz (91.5 kg) SpO2 BMI Smoking Status 97% 27.37 kg/m2 Never Smoker BMI and BSA Data Body Mass Index Body Surface Area  
 27.37 kg/m 2 2.16 m 2 Preferred Pharmacy Pharmacy Name Phone [id number] [address] [facility] [phone] Your Updated Medication List  
  
   
This list is accurate as of 18 11:54 AM.  Always use your most recent med list.  
  
  
  
  
 mdpvnzz-efqnpgpue-mssguacmtxpl -325 mg capsule Commonly known as:  MIDRIN  
2 tabs po, followed by 1 tab po every hour until relief is obtained, not to exceed 5 tabs in 12 hours  Indications: Migraine, TENSION-TYPE HEADACHE nystatin-triamcinolone topical cream  
Commonly known as:  MYCOLOG II Apply  to affected area two (2) times a day. triamcinolone 55 mcg nasal inhaler Commonly known as:  NASACORT AQ  
1 [place]. Prescriptions Sent to Pharmacy Refills  
 nystatin-triamcinolone (MYCOLOG II) topical cream 1 Sig: Apply  to affected area two (2) times a day. Class: Normal  
 Pharmacy: [id number], [address] [facility] #: [phone] Route: Topical  
  
Patient Instructions 1) Skin rash Mycolog ointment - apply twice a day for 7-10 days or until rash goes away. Yeast Skin Infection: Care Instructions Your Care Instructions Yeast normally lives on your skin. Sometimes too much yeast can overgrow in certain areas of the skin and cause an infection. The infection causes red, scaly, moist patches on your skin that may itch. Common areas for skin yeast infections are skin folds under the breasts or belly area. The warm and moist areas in the skin folds can make it easier for yeast to overgrow. Yeast infections also can be found on other parts of the body such as the groin or armpits. You will probably get a cream or ointment that contains an antifungal medicine. Examples of these are miconazole and clotrimazole. You put it on your skin to treat the infection. Your doctor may give you a prescription for the cream or ointment. Or you may be able to buy it without a prescription at most drugstores. If the infection is severe, the doctor will prescribe antifungal pills. A yeast infection usually goes away after about a week of treatment. But it's important to use the medicine for as long as your doctor tells you to. Follow-up care is a key part of your treatment and safety. Be sure to make and go to all appointments, and call your doctor if you are having problems.  It's also a good idea to know your test results and keep a list of the medicines you take. How can you care for yourself at home? · Be safe with medicines. Take your medicines exactly as prescribed. Call your doctor if you think you are having a problem with your medicine. · Keep your skin clean and dry. Your doctor may suggest using powder that contains an antifungal medicine in the skin folds. · Wear loose clothing. When should you call for help? Call your doctor now or seek immediate medical care if: 
? · You have symptoms of infection, such as: 
¨ Increased pain, swelling, warmth, or redness. ¨ Red streaks leading from the area. ¨ Pus draining from the area. ¨ A fever. ? Watch closely for changes in your health, and be sure to contact your doctor if: 
? · You do not get better as expected. Where can you learn more? Go to http://eladia-meaghan.info/. Enter I277 in the search box to learn more about \"Yeast Skin Infection: Care Instructions. \" Current as of: October 13, 2016 Content Version: 11.4 © 6061-2369 [employer]. Care instructions adapted under license by [employer] (which disclaims liability or warranty for this information). If you have questions about a medical condition or this instruction, always ask your healthcare professional. [place] [address] any warranty or liability for your use of this information. Introducing \[facility] [place] [facility]\"" & HEALTH SERVICES! [employer] introduces [employer] patient portal. Now you can access parts of your medical record, email your doctor's office, and request medication refills online. 1. In your internet browser, go to [email or web address] 2. Click on the First Time User? Click Here link in the Sign In box. You will see the New Member Sign Up page. 3. Enter your [employer] Access Code exactly as it appears below. You will not need to use this code after youve completed the sign-up process.  If you do not sign up before the expiration date, you must request a new code. · Arsanis Access Code: [email or web address] Expires: 7/25/2018  4:08 PM 
 
4. Enter the last four digits of your Social Security Number (xxxx) and Date of Birth (mm/dd/yyyy) as indicated and click Submit. You will be taken to the next sign-up page. 5. Create a Arsanis ID. This will be your Arsanis login ID and cannot be changed, so think of one that is secure and easy to remember. 6. Create a Arsanis password. You can change your password at any time. 7. Enter your Password Reset Question and Answer. This can be used at a later time if you forget your password. 8. Enter your e-mail address. You will receive e-mail notification when new information is available in [address]. 9. Click Sign Up. You can now view and download portions of your medical record. 10. Click the Download Summary menu link to download a portable copy of your medical information. If you have questions, please visit the Frequently Asked Questions section of the Ar[employer]is website. Remember, Arsanis is NOT to be used for urgent needs. For medical emergencies, dial 911. Now available from your iPhone and Android! Please provide this summary of care documentation to your next provider. Your primary care clinician is listed as Manjeet Henson. If you have any questions after today's visit, please call [phone].

## 2018-06-05 NOTE — PROGRESS NOTES
S: lEeazar Llamas is a 47 y.o. male who presents for skin/rash    Assessment/Plan:  1. Skin rash  -right groin: localized pruritic, erythematous circular patches without discharge or fluctuance  -rx:  nystatin-triamcinolone (MYCOLOG II) topical cream bid  -advised to keep area dry, supportive instructions given    RTC if no improvement in 7-10 days     HPI:    Onset: a few weeks ago   Insect/animal/human bite  Duration/Frequency  Location_ right groin  Drainage: none  +Itching  No Pain  No Burning  Not worse at night or different times of year  No fever/chills  No nausea/vomiting  No recent travel, recent camping/hiking, or near wood piles  Relieving factors: nothing   Aggravating factors: sweat    Social History:  Nutrition: has started eating low carb d/t A1c 5.8%  Occupation:    Tobacco: none    Review of Systems:  - Constitutional Symptoms: no fatigue  - Cardiovascular: no chest pain or palpitations  - Respiratory: no cough or shortness of breath    I reviewed the following:  Past Medical History:   Diagnosis Date    Blood clot associated with vein wall inflammation 2014    Giant cell tumor     leg       Current Outpatient Prescriptions   Medication Sig Dispense Refill    xymvbxh-wtegmbrag-ffwkpyrxgdcn (MIDRIN) -325 mg capsule 2 tabs po, followed by 1 tab po every hour until relief is obtained, not to exceed 5 tabs in 12 hours  Indications: Migraine, TENSION-TYPE HEADACHE 30 Cap 2    triamcinolone (NASACORT AQ) 55 mcg nasal inhaler 1 Spray. Allergies   Allergen Reactions    Amitriptyline Rash    Levofloxacin Shortness of Breath and Swelling    Mold Itching         O: VS:   Visit Vitals    /82 (BP 1 Location: Left arm, BP Patient Position: Sitting)    Pulse 84    Temp 96.8 °F (36 °C) (Oral)    Resp 20    Ht 6' (1.829 m)    Wt 201 lb 12.8 oz (91.5 kg)    SpO2 97%    BMI 27.37 kg/m2       GENERAL: Eleazar Llamas is in no acute distress. Non-toxic. Well nourished.  Well developed. Appropriately groomed. HEAD:  Normocephalic. Atraumatic. Non tender sinuses x 4. RESP: Breath sounds are symmetrical bilaterally. Unlabored without SOB. Speaking in full sentences. Clear to auscultation bilaterally anteriorly and posteriorly. No wheezes. No rales or rhonchi. CV: normal rate. Regular rhythm. S1, S2 audible. No murmur noted. No rubs, clicks or gallops noted. SKIN: Skin is warm and dry. Turgor is normal. No cyanosis. No jaundice or pallor. Right groin: localized pruritic, erythematous circular patches without discharge or fluctuance  PSYCH: appropriate behavior, dress and thought processes. Good eye contact. Clear and coherent speech. Full affect. Good insight.         ______________________________________________________________________  Patient education was done. Advised on nutrition, tobacco, and alcohol. Counseling included discussion of diagnosis, differentials, treatment options, prescribed treatment, warning signs and follow up. Medication risks/benefits, interactions and alternatives discussed with patient.      Patient verbalized understanding and agreed to plan of care. Follow up in 1-2 weeks as needed or if symptoms progress.

## 2018-06-05 NOTE — PROGRESS NOTES
Chief Complaint   Patient presents with    Other     possible ring worm on r thigh       Arlene Cabrerafts  Identified pt with two pt identifiers(name and ). Chief Complaint   Patient presents with    Other     possible ring worm on r thigh       1. Have you been to the ER, urgent care clinic since your last visit? N  Hospitalized since your last visit? No    2. Have you seen or consulted any other health care providers outside of the [facility] since your last visit? Include any pap smears or colon screening. Yes/No    Today's provider has been notified of reason for visit, vitals and flowsheets obtained on patients.      Patient received paperwork for advance directive during previous visit but has not completed at this time     Reviewed record In preparation for visit, huddled with provider and have obtained necessary documentation      Health Maintenance Due   Topic    DTaP/Tdap/Td series (1 - Tdap)    FOBT Q 1 YEAR AGE 50-75        Wt Readings from Last 3 Encounters:   18 201 lb 12.8 oz (91.5 kg)   18 202 lb 4.8 oz (91.8 kg)   18 208 lb (94.3 kg)     Temp Readings from Last 3 Encounters:   18 96.8 °F (36 °C) (Oral)   18 97.6 °F (36.4 °C) (Oral)   18 97.4 °F (36.3 °C) (Oral)     BP Readings from Last 3 Encounters:   18 118/82   18 117/73   18 130/76     Pulse Readings from Last 3 Encounters:   18 84   18 79   18 91     Vitals:    18 1124   BP: 118/82   Pulse: 84   Resp: 20   Temp: 96.8 °F (36 °C)   TempSrc: Oral   SpO2: 97%   Weight: 201 lb 12.8 oz (91.5 kg)   Height: 6' (1.829 m)   PainSc:   0 - No pain         Learning Assessment:  :     Learning Assessment 2018   PRIMARY LEARNER Patient   HIGHEST LEVEL OF EDUCATION - PRIMARY LEARNER  SOME COLLEGE   PRIMARY LANGUAGE ENGLISH   LEARNER PREFERENCE PRIMARY READING   ANSWERED BY self   RELATIONSHIP SELF       Depression Screening:  :     PHQ over the last two weeks 6/5/2018   Little interest or pleasure in doing things Not at all   Feeling down, depressed or hopeless Not at all   Total Score PHQ 2 0       Fall Risk Assessment:  :     No flowsheet data found. Abuse Screening:  :     No flowsheet data found. ADL Screening:  :     ADL Assessment 6/5/2018   Feeding yourself No Help Needed   Getting from bed to chair No Help Needed   Getting dressed No Help Needed   Bathing or showering No Help Needed   Walk across the room (includes cane/walker) No Help Needed   Using the telphone No Help Needed   Taking your medications No Help Needed   Preparing meals No Help Needed   Managing money (expenses/bills) No Help Needed   Moderately strenuous housework (laundry) No Help Needed   Shopping for personal items (toiletries/medicines) No Help Needed   Shopping for groceries No Help Needed   Driving No Help Needed   Climbing a flight of stairs No Help Needed   Getting to places beyond walking distances No Help Needed                 Medication reconciliation up to date and corrected with patient at this time.

## 2018-06-29 ENCOUNTER — OFFICE VISIT (OUTPATIENT)
Dept: FAMILY MEDICINE CLINIC | Age: 53
End: 2018-06-29

## 2018-06-29 VITALS
WEIGHT: 203.8 LBS | HEART RATE: 85 BPM | TEMPERATURE: 95.9 F | HEIGHT: 72 IN | RESPIRATION RATE: 20 BRPM | BODY MASS INDEX: 27.6 KG/M2 | SYSTOLIC BLOOD PRESSURE: 123 MMHG | OXYGEN SATURATION: 95 % | DIASTOLIC BLOOD PRESSURE: 68 MMHG

## 2018-06-29 DIAGNOSIS — R39.11 URINARY HESITANCY: ICD-10-CM

## 2018-06-29 DIAGNOSIS — G44.211 INTRACTABLE EPISODIC TENSION-TYPE HEADACHE: Primary | ICD-10-CM

## 2018-06-29 LAB
BILIRUB UR QL STRIP: NEGATIVE
GLUCOSE UR-MCNC: NORMAL MG/DL
KETONES P FAST UR STRIP-MCNC: NEGATIVE MG/DL
PH UR STRIP: 5.5 [PH] (ref 4.6–8)
PROT UR QL STRIP: NEGATIVE
SP GR UR STRIP: 1.02 (ref 1–1.03)
UA UROBILINOGEN AMB POC: NORMAL (ref 0.2–1)
URINALYSIS CLARITY POC: CLEAR
URINALYSIS COLOR POC: YELLOW
URINE BLOOD POC: NEGATIVE
URINE LEUKOCYTES POC: NEGATIVE
URINE NITRITES POC: NEGATIVE

## 2018-06-29 RX ORDER — BUTALBITAL, ACETAMINOPHEN, CAFFEINE AND CODEINE PHOSPHATE 50; 325; 40; 30 MG/1; MG/1; MG/1; MG/1
1 CAPSULE ORAL
Qty: 30 CAP | Refills: 1 | Status: SHIPPED | OUTPATIENT
Start: 2018-06-29 | End: 2019-07-16 | Stop reason: ALTCHOICE

## 2018-06-29 NOTE — PATIENT INSTRUCTIONS
1) Urinary hesitancy - urinalysis didn't show sign of infection. Will test PSA. Prostate-Specific Antigen (PSA) -  a test detecting PSA, a protein that can be produced by cancerous and noncancerous tissue in the prostate. (The prostate is a gland that sits below the bladder and is responsible for secreting fluid in semen.)       2)  Good hydration, proper sleep and healthy eating will help reduce headache frequency. Will refill fioricet prescription     Tension Headache: Care Instructions  Your Care Instructions  Most headaches are tension headaches. These headaches tend to happen again, especially if you are under stress. A tension headache may cause pain or a feeling of pressure all over your head. You probably can't pinpoint the center of the pain. If you keep getting tension headaches, the best thing you can do to limit them is to find out what is causing them and then make changes in those areas. Follow-up care is a key part of your treatment and safety. Be sure to make and go to all appointments, and call your doctor if you are having problems. It's also a good idea to know your test results and keep a list of the medicines you take. How can you care for yourself at home? · Rest in a quiet, dark room with a cool cloth on your forehead until your headache is gone. Close your eyes, and try to relax or go to sleep. Don't watch TV or read. Avoid using the computer. · Use a warm, moist towel or a heating pad set on low to relax tight shoulder and neck muscles. · Have someone gently massage your neck and shoulders. · Take pain medicines exactly as directed. ¨ If the doctor gave you a prescription medicine for pain, take it as prescribed. ¨ If you are not taking a prescription pain medicine, ask your doctor if you can take an over-the-counter medicine.   · Be careful not to take pain medicine more often than the instructions allow, because you may get worse or more frequent headaches when the medicine wears off. · If you get another tension headache, stop what you are doing and sit quietly for a moment. Close your eyes and breathe slowly. Try to relax your head and neck muscles. · Do not ignore new symptoms that occur with a headache, such as fever, weakness or numbness, vision changes, or confusion. These may be signs of a more serious problem. To help prevent headaches  · Keep a headache diary so you can figure out what triggers your headaches. Avoiding triggers may help you prevent headaches. Record when each headache began, how long it lasted, and what the pain was like (throbbing, aching, stabbing, or dull). List anything that may have triggered the headache, such as being physically or emotionally stressed or being anxious or depressed. Other possible triggers are hunger, anger, fatigue, poor posture, and muscle strain. · Find healthy ways to deal with stress. Headaches are most common during or right after stressful times. Take time to relax before and after you do something that has caused a headache in the past.  · Exercise daily to relieve stress. Relaxation exercises may help reduce tension. · Get plenty of sleep. · Eat regularly and well. Long periods without food can trigger a headache. · Treat yourself to a massage. Some people find that massages are very helpful in relieving tension. · Try to keep your muscles relaxed by keeping good posture. Check your jaw, face, neck, and shoulder muscles for tension, and try to relax them. When sitting at a desk, change positions often, and stretch for 30 seconds each hour. · Reduce eyestrain from computers by blinking frequently and looking away from the computer screen every so often. Make sure you have proper eyewear and that your monitor is set up properly, about an arm's length away. When should you call for help? Call 911 anytime you think you may need emergency care. For example, call if:  ? · You have signs of a stroke.  These may include:  ¨ Sudden numbness, paralysis, or weakness in your face, arm, or leg, especially on only one side of your body. ¨ Sudden vision changes. ¨ Sudden trouble speaking. ¨ Sudden confusion or trouble understanding simple statements. ¨ Sudden problems with walking or balance. ¨ A sudden, severe headache that is different from past headaches. ?Call your doctor now or seek immediate medical care if:  ? · You have new or worse nausea and vomiting. ? · You have a new or higher fever. ? · Your headache gets much worse. ? Watch closely for changes in your health, and be sure to contact your doctor if:  ? · You are not getting better after 2 days (48 hours). Where can you learn more? Go to http://eladia-meaghan.info/. Enter 31 17 33 in the search box to learn more about \"Tension Headache: Care Instructions. \"  Current as of: October 14, 2016  Content Version: 11.4  © 6524-1303 [employer]. Care instructions adapted under license by [employer] (which disclaims liability or warranty for this information). If you have questions about a medical condition or this instruction, always ask your healthcare professional. [place] [address] any warranty or liability for your use of this information.

## 2018-06-29 NOTE — MR AVS SNAPSHOT
[address] 
[address] [place] [id number] 
[phone] Patient: Taryn Rogers MRN: [id number] :1965 Visit Information Date & Time Provider Department Dept. Phone Encounter #  
 2018  1:20 PM Tim Sr NP [facility] [phone] [id number] Upcoming Health Maintenance Date Due DTaP/Tdap/Td series (1 - Tdap) 2018* FOBT Q 1 YEAR AGE 50-75 2019* Influenza Age 5 to Adult 2018 *Topic was postponed. The date shown is not the original due date. Allergies as of 2018  Review Complete On: 2018 By: Tim Sr NP Severity Noted Reaction Type Reactions Amitriptyline  2017    Rash Levofloxacin  2017    Shortness of Breath, Swelling Mold  2018    Itching Current Immunizations  Reviewed on 2018 No immunizations on file. Not reviewed this visit You Were Diagnosed With   
  
 Codes Comments Intractable episodic tension-type headache    -  Primary ICD-10-CM: N88.082 ICD-9-CM: 339.11 Urinary hesitancy     ICD-10-CM: R39.11 ICD-9-CM: 788.64 Vitals BP Pulse Temp Resp Height(growth percentile) Weight(growth percentile) 123/68 (BP 1 Location: Left arm, BP Patient Position: Sitting) 85 95.9 °F (35.5 °C) (Oral) 20 6' (1.829 m) 203 lb 12.8 oz (92.4 kg) SpO2 BMI Smoking Status 95% 27.64 kg/m2 Never Smoker BMI and BSA Data Body Mass Index Body Surface Area  
 27.64 kg/m 2 2.17 m 2 Preferred Pharmacy Pharmacy Name Phone [phone] [phone] Your Updated Medication List  
  
   
This list is accurate as of 18  1:44 PM.  Always use your most recent med list.  
  
  
  
  
 codeine-butalbital-acetaminophen-caffeine -78-30 mg capsule Commonly known as:  FIORICET WITH CODEINE  
 Take 1 Cap by mouth every six (6) hours as needed for Headache. Max Daily Amount: 4 Caps. nystatin-triamcinolone topical cream  
Commonly known as:  MYCOLOG II Apply  to affected area two (2) times a day. triamcinolone 55 mcg nasal inhaler Commonly known as:  NASACORT AQ  
1 [place]. Prescriptions Printed Refills  
 codeine-butalbital-acetaminophen-caffeine (FIORICET WITH CODEINE) -16-30 mg capsule 1 Sig: Take 1 Cap by mouth every six (6) hours as needed for Headache. Max Daily Amount: 4 Caps. Class: Print Route: Oral  
  
We Performed the Following PSA, DIAGNOSTIC (PROSTATE SPECIFIC AG) [id number] CPT(R)] Patient Instructions 1) Urinary hesitancy - urinalysis didn't show sign of infection. Will test PSA. Prostate-Specific Antigen (PSA) -  a test detecting PSA, a protein that can be produced by cancerous and noncancerous tissue in the prostate. (The prostate is a gland that sits below the bladder and is responsible for secreting fluid in semen.) 2)  Good hydration, proper sleep and healthy eating will help reduce headache frequency. Will refill fioricet prescription Tension Headache: Care Instructions Your Care Instructions Most headaches are tension headaches. These headaches tend to happen again, especially if you are under stress. A tension headache may cause pain or a feeling of pressure all over your head. You probably can't pinpoint the center of the pain. If you keep getting tension headaches, the best thing you can do to limit them is to find out what is causing them and then make changes in those areas. Follow-up care is a key part of your treatment and safety. Be sure to make and go to all appointments, and call your doctor if you are having problems. It's also a good idea to know your test results and keep a list of the medicines you take. How can you care for yourself at home? · Rest in a quiet, dark room with a cool cloth on your forehead until your headache is gone. Close your eyes, and try to relax or go to sleep. Don't watch TV or read. Avoid using the computer. · Use a warm, moist towel or a heating pad set on low to relax tight shoulder and neck muscles. · Have someone gently massage your neck and shoulders. · Take pain medicines exactly as directed. ¨ If the doctor gave you a prescription medicine for pain, take it as prescribed. ¨ If you are not taking a prescription pain medicine, ask your doctor if you can take an over-the-counter medicine. · Be careful not to take pain medicine more often than the instructions allow, because you may get worse or more frequent headaches when the medicine wears off. · If you get another tension headache, stop what you are doing and sit quietly for a moment. Close your eyes and breathe slowly. Try to relax your head and neck muscles. · Do not ignore new symptoms that occur with a headache, such as fever, weakness or numbness, vision changes, or confusion. These may be signs of a more serious problem. To help prevent headaches · Keep a headache diary so you can figure out what triggers your headaches. Avoiding triggers may help you prevent headaches. Record when each headache began, how long it lasted, and what the pain was like (throbbing, aching, stabbing, or dull). List anything that may have triggered the headache, such as being physically or emotionally stressed or being anxious or depressed. Other possible triggers are hunger, anger, fatigue, poor posture, and muscle strain. · Find healthy ways to deal with stress. Headaches are most common during or right after stressful times. Take time to relax before and after you do something that has caused a headache in the past. 
· Exercise daily to relieve stress. Relaxation exercises may help reduce tension. · Get plenty of sleep. · Eat regularly and well. Long periods without food can trigger a headache. · Treat yourself to a massage. Some people find that massages are very helpful in relieving tension. · Try to keep your muscles relaxed by keeping good posture. Check your jaw, face, neck, and shoulder muscles for tension, and try to relax them. When sitting at a desk, change positions often, and stretch for 30 seconds each hour. · Reduce eyestrain from computers by blinking frequently and looking away from the computer screen every so often. Make sure you have proper eyewear and that your monitor is set up properly, about an arm's length away. When should you call for help? Call 911 anytime you think you may need emergency care. For example, call if: 
? · You have signs of a stroke. These may include: 
¨ Sudden numbness, paralysis, or weakness in your face, arm, or leg, especially on only one side of your body. ¨ Sudden vision changes. ¨ Sudden trouble speaking. ¨ Sudden confusion or trouble understanding simple statements. ¨ Sudden problems with walking or balance. ¨ A sudden, severe headache that is different from past headaches. ?Call your doctor now or seek immediate medical care if: 
? · You have new or worse nausea and vomiting. ? · You have a new or higher fever. ? · Your headache gets much worse. ? Watch closely for changes in your health, and be sure to contact your doctor if: 
? · You are not getting better after 2 days (48 hours). Where can you learn more? Go to http://eladia-meaghan.info/. Enter [phone] 76 20 in the search box to learn more about \"Tension Headache: Care Instructions. \" Current as of: October 14, 2016 Content Version: 11.4 © 3046-5990 [employer]. Care instructions adapted under license by [employer] (which disclaims liability or warranty for this information).  If you have questions about a medical condition or this instruction, always ask your healthcare professional. [place] [address] any warranty or liability for your use of this information. Introducing \[facility]\"" & HEALTH SERVICES! [address] introduces [employer] patient portal. Now you can access parts of your medical record, email your doctor's office, and request medication refills online. 1. In your internet browser, go to [email or web address] 2. Click on the First Time User? Click Here link in the Sign In box. You will see the New Member Sign Up page. 3. Enter your [employer] Access Code exactly as it appears below. You will not need to use this code after youve completed the sign-up process. If you do not sign up before the expiration date, you must request a new code. · [employer] Access Code: [email or web address] Expires: 7/25/2018  4:08 PM 
 
4. Enter the last four digits of your Social Security Number (xxxx) and Date of Birth (mm/dd/yyyy) as indicated and click Submit. You will be taken to the next sign-up page. 5. Create a [employer] ID. This will be your [employer] login ID and cannot be changed, so think of one that is secure and easy to remember. 6. Create a [employer] password. You can change your password at any time. 7. Enter your Password Reset Question and Answer. This can be used at a later time if you forget your password. 8. Enter your e-mail address. You will receive e-mail notification when new information is available in [address] 9. Click Sign Up. You can now view and download portions of your medical record. 10. Click the Download Summary menu link to download a portable copy of your medical information. If you have questions, please visit the Frequently Asked Questions section of the [employer] website. Remember, [employer] is NOT to be used for urgent needs. For medical emergencies, dial 911. Now available from your iPhone and Android! Please provide this summary of care documentation to your next provider. Your primary care clinician is listed as Teresa Lugo. If you have any questions after today's visit, please call [phone].

## 2018-06-29 NOTE — PROGRESS NOTES
S: Timothy Mcdonald is a 47 y.o. male who presents for follow up     Assessment/Plan:   1. Intractable episodic tension-type headache  -used fioricet in past and worked well, requesting a rx for this  -CS current  - codeine-butalbital-acetaminophen-caffeine (FIORICET WITH CODEINE) -11-30 mg capsule; Take 1 Cap by mouth every six (6) hours as needed for Headache. Max Daily Amount: 4 Caps. Dispense: 30 Cap; Refill: 1    2. Urinary hesitancy  -uses saw palmetto and has helped with urination, but not working well now. -UA = neg today  - PROSTATE SPECIFIC AG         HPI:  HA medication - would like fioricet   Saw palmetto for urination issues -   Nocturia, no issues dribbling or stopping a stream    BP - 120's/60-80s  No issues of lightheadedness - occasionally will have black spots in front of eyes  Hasn't had anymore dizzy spells since job change - now working in [employer] environment    Social History:  Occupation: changed jobs, working in air conditioned     Review of Systems:  - Constitutional Symptoms: no fevers, chills  - Cardiovascular: no chest pain or palpitations  - Respiratory: no cough or shortness of breath  - Gastrointestinal: no dysphagia or abdominal pain    PHQ over the last two weeks 6/29/2018   Little interest or pleasure in doing things Not at all   Feeling down, depressed or hopeless Not at all   Total Score PHQ 2 0       I reviewed the following:  Past Medical History:   Diagnosis Date    Blood clot associated with vein wall inflammation 2014    Giant cell tumor     leg       Current Outpatient Prescriptions   Medication Sig Dispense Refill    nystatin-triamcinolone (MYCOLOG II) topical cream Apply  to affected area two (2) times a day.  60 g 1    ulsjmmd-yqroxgirv-txkdstnmdkur (MIDRIN) -325 mg capsule 2 tabs po, followed by 1 tab po every hour until relief is obtained, not to exceed 5 tabs in 12 hours  Indications: Migraine, TENSION-TYPE HEADACHE 30 Cap 2    triamcinolone (NASACORT AQ) 55 mcg nasal inhaler 1 Spray. Allergies   Allergen Reactions    Amitriptyline Rash    Levofloxacin Shortness of Breath and Swelling    Mold Itching        O: VS:   Visit Vitals    /68 (BP 1 Location: Left arm, BP Patient Position: Sitting)    Pulse 85    Temp 95.9 °F (35.5 °C) (Oral)    Resp 20    Ht 6' (1.829 m)    Wt 203 lb 12.8 oz (92.4 kg)    SpO2 95%    BMI 27.64 kg/m2       GENERAL: Tanesha Eye is in no acute distress. Non-toxic. Well nourished. Well developed. Appropriately groomed. HEAD:  Normocephalic. Atraumatic. Non tender sinuses x 4. RESP: Breath sounds are symmetrical bilaterally. Unlabored without SOB. Speaking in full sentences. Clear to auscultation bilaterally anteriorly and posteriorly. No wheezes. No rales or rhonchi. CV: normal rate. Regular rhythm. S1, S2 audible. No murmur noted. No rubs, clicks or gallops noted. MUSC:  Intact x 4 extremities. Full ROM x 4 extremities. No pain with movement. HEME/LYMPH: peripheral pulses palpable 2+ x 4 extremities. No peripheral edema is noted. PSYCH: appropriate behavior, dress and thought processes. Good eye contact. Clear and coherent speech. Full affect. Good insight.     ___________________________________________________________________  Patient education was done. Advised on nutrition, physical activity and safety. Counseling included discussion of diagnosis, differentials, treatment options, prescribed treatment, warning signs and follow up. Medication risks/benefits, interactions and alternatives discussed with patient.      Patient verbalized understanding and agreed to plan of care. Patient was given an after visit summary which included current diagnoses, medications and vital signs. Follow up as directed.

## 2018-06-29 NOTE — PROGRESS NOTES
Chief Complaint   Patient presents with    Urinary Hesitancy     couple of years ? OTC meds not working anymore    Other     would like to change migraine medication       Lucia Menchaca  Identified pt with two pt identifiers(name and ). Chief Complaint   Patient presents with    Urinary Hesitancy     couple of years ? OTC meds not working anymore    Other     would like to change migraine medication       1. Have you been to the ER, urgent care clinic since your last visit? N Hospitalized since your last visit? No    2. Have you seen or consulted any other health care providers outside of the [address] [place] [address] since your last visit? N  Include any pap smears or colon screening. No    Today's provider has been notified of reason for visit, vitals and flowsheets obtained on patients. Reviewed record In preparation for visit, huddled with provider and have obtained necessary documentation      There are no preventive care reminders to display for this patient.     Wt Readings from Last 3 Encounters:   18 203 lb 12.8 oz (92.4 kg)   18 201 lb 12.8 oz (91.5 kg)   18 202 lb 4.8 oz (91.8 kg)     Temp Readings from Last 3 Encounters:   18 95.9 °F (35.5 °C) (Oral)   18 96.8 °F (36 °C) (Oral)   18 97.6 °F (36.4 °C) (Oral)     BP Readings from Last 3 Encounters:   18 123/68   18 118/82   18 117/73     Pulse Readings from Last 3 Encounters:   18 85   18 84   18 79     Vitals:    18 1317   BP: 123/68   Pulse: 85   Resp: 20   Temp: 95.9 °F (35.5 °C)   TempSrc: Oral   SpO2: 95%   Weight: 203 lb 12.8 oz (92.4 kg)   Height: 6' (1.829 m)   PainSc:   0 - No pain         Learning Assessment:  :     Learning Assessment 2018   PRIMARY LEARNER Patient   HIGHEST LEVEL OF EDUCATION - PRIMARY LEARNER  SOME COLLEGE   PRIMARY LANGUAGE ENGLISH   LEARNER PREFERENCE PRIMARY READING   ANSWERED BY self   RELATIONSHIP SELF       Depression Screening:  :     PHQ over the last two weeks 6/29/2018   Little interest or pleasure in doing things Not at all   Feeling down, depressed or hopeless Not at all   Total Score PHQ 2 0       Fall Risk Assessment:  :     No flowsheet data found. Abuse Screening:  :     No flowsheet data found. ADL Screening:  :     ADL Assessment 6/5/2018   Feeding yourself No Help Needed   Getting from bed to chair No Help Needed   Getting dressed No Help Needed   Bathing or showering No Help Needed   Walk across the room (includes cane/walker) No Help Needed   Using the telphone No Help Needed   Taking your medications No Help Needed   Preparing meals No Help Needed   Managing money (expenses/bills) No Help Needed   Moderately strenuous housework (laundry) No Help Needed   Shopping for personal items (toiletries/medicines) No Help Needed   Shopping for groceries No Help Needed   Driving No Help Needed   Climbing a flight of stairs No Help Needed   Getting to places beyond walking distances No Help Needed       Verbal order to obtain urine and perform dipstick        Medication reconciliation up to date and corrected with patient at this time.

## 2018-06-30 LAB
AMPHETAMINES UR QL SCN: NEGATIVE NG/ML
BARBITURATES UR QL SCN: NEGATIVE NG/ML
BENZODIAZ UR QL: NEGATIVE NG/ML
BZE UR QL: NEGATIVE NG/ML
CANNABINOIDS UR QL SCN: NEGATIVE NG/ML
MDMA UR QL SCN: NEGATIVE NG/ML
METHADONE UR QL SCN: NEGATIVE NG/ML
METHAQUALONE UR QL: NEGATIVE NG/ML
OPIATES UR QL: NEGATIVE NG/ML
PCP UR QL: NEGATIVE NG/ML
PROPOXYPH UR QL SCN: NEGATIVE NG/ML

## 2018-07-19 ENCOUNTER — OFFICE VISIT (OUTPATIENT)
Dept: FAMILY MEDICINE CLINIC | Age: 53
End: 2018-07-19

## 2018-07-19 VITALS
RESPIRATION RATE: 19 BRPM | HEIGHT: 72 IN | HEART RATE: 84 BPM | OXYGEN SATURATION: 96 % | TEMPERATURE: 96.1 F | BODY MASS INDEX: 27.3 KG/M2 | WEIGHT: 201.6 LBS | DIASTOLIC BLOOD PRESSURE: 74 MMHG | SYSTOLIC BLOOD PRESSURE: 107 MMHG
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
## 2018-07-19 NOTE — PROGRESS NOTES
S: Toñito Higgins is a 47 y.o. male who presents for skin/rash    Assessment/Plan:  1. Tick bite, initial encounter  -Left scapula: 2mm erythematous papule with eschar. No discharge or ecchymosis noted  -pt's mom pulled tick off pt 3 weeks ago; denies fatigue, f/c/n/v, joint or muscle aches  -declines tick bourne illness labs today; advised to watch for s/s         HPI:    Onset : 3 weeks ago - mom pulled engorged tick off his shoulder. Not sure what kind of tick it was - \"big one\"  Not worse at night or different times of year  No fever/chills  No nausea/vomiting  No achy joints     BP running well at home  Reduced dosage of saw palmetto to 425 and his BP dropped;   BP at home: 107-132/ 88-67    Social History:  Nutrition: eating one meal a day and using protein shakes in other   Social: lives with mom  Occupation: new job going well in [employer] - no more issues with dizziness  History   Smoking Status    Never Smoker   Smokeless Tobacco    Never Used     History   Alcohol Use No     History   Drug Use No       Review of Systems:  - Constitutional Symptoms: no fatigue  - Cardiovascular: no chest pain or palpitations  - Respiratory: no cough or shortness of breath  - Gastrointestinal: no dysphagia or abdominal pain  - Musculoskeletal: no joint pains or weakness  - Neurological: no numbness, tingling, or headaches  - Psychiatric: no depression or anxiety  - Endocrine: no heat or cold intolerance, no polyuria or polydipsia    I reviewed the following:  Past Medical History:   Diagnosis Date    Blood clot associated with vein wall inflammation 2014    Giant cell tumor     leg       Current Outpatient Prescriptions   Medication Sig Dispense Refill    codeine-butalbital-acetaminophen-caffeine (FIORICET WITH CODEINE) -88-30 mg capsule Take 1 Cap by mouth every six (6) hours as needed for Headache. Max Daily Amount: 4 Caps.  30 Cap 1    nystatin-triamcinolone (MYCOLOG II) topical cream Apply  to affected area two (2) times a day. 60 g 1    triamcinolone (NASACORT AQ) 55 mcg nasal inhaler 1 Spray. Allergies   Allergen Reactions    Amitriptyline Rash    Levofloxacin Shortness of Breath and Swelling    Mold Itching         O: VS:   Visit Vitals    /74 (BP 1 Location: Left arm, BP Patient Position: Sitting)    Pulse 84    Temp 96.1 °F (35.6 °C) (Oral)    Resp 19    Ht 6' (1.829 m)    Wt 201 lb 9.6 oz (91.4 kg)    SpO2 96%    BMI 27.34 kg/m2       GENERAL: Gabriel Bangura is in no acute distress. Non-toxic. Well nourished. Well developed. Appropriately groomed. HEAD:  Normocephalic. Atraumatic. Non tender sinuses x 4. NECK: supple. Midline trachea. No cervical adenopathy noted. RESP: Breath sounds are symmetrical bilaterally. Unlabored without SOB. Speaking in full sentences. Clear to auscultation bilaterally anteriorly and posteriorly. No wheezes. No rales or rhonchi. CV: normal rate. Regular rhythm. S1, S2 audible. No murmur noted. No rubs, clicks or gallops noted. SKIN: Skin is warm and dry. Turgor is normal. No cyanosis. No jaundice or pallor. Left scapula: 2mm erythematous papule with eschar. No discharge or ecchymosis noted  PSYCH: appropriate behavior, dress and thought processes. Good eye contact. Clear and coherent speech. Full affect. Good insight.         ______________________________________________________________________  Patient education was done. Advised on nutrition, tobacco, and alcohol. Counseling included discussion of diagnosis, differentials, treatment options, prescribed treatment, warning signs and follow up. Medication risks/benefits, interactions and alternatives discussed with patient.      Patient verbalized understanding and agreed to plan of care. Follow up in 1-2 weeks as needed or if symptoms progress.

## 2018-07-19 NOTE — PATIENT INSTRUCTIONS
1) Tick bite - Please monitor for signs or symptoms of infection including redness, warmth, swelling, discharge and/or fever/chills. There are many different types of ticks in the [place], some of which are capable of transmitting infections. The risk of developing these infections depends upon the geographic location, season of the year, type of tick, and, for Lyme disease, how long the tick was attached to the skin. While many people are concerned after being bitten by a tick, the risk of acquiring a tick-borne infection is quite low, even if the tick has been attached, fed, and is actually carrying an infectious agent. Ticks transmit infection only after they have attached and then taken a blood meal from their new host. A tick that has not attached (and therefore has not yet become engorged from its blood meal) has not passed any infection. Since the deer tick that transmits Lyme disease must feed for >36 hours before transmission of the spirochete, the risk of acquiring Lyme disease from an observed tick bite, for example, is only 1.2 to 1.4 percent, even in an area where the disease is common. The organism that causes Lyme disease, Borrelia burgdorferi, lies dormant in the inner aspect of the tick's midgut. The organism becomes active only after exposure to the warm blood meal entering the tick's gut. Once active, the organism enters the tick's salivary glands. As the tick feeds, it must get rid of excess water through the salivary glands. Thus, the tick will literally salivate organisms into the wound, thereby passing the infection to the host.    If a person is bitten by a deer tick (the type of tick that carries Lyme disease), a healthcare provider will likely advise one of two approaches:    ?Observe and treat if signs or symptoms of infection develop  ? Treat with a preventive antibiotic immediately    There is no benefit of blood testing for Lyme disease at the time of the tick bite; even people who become infected will not have a positive blood test until approximately two to six weeks after the infection develops (post-tick bite). The history of the tick bite will largely determine which of these options is chosen. Before seeking medical attention, the affected person or household member should carefully remove the tick and make note of its appearance. Only the Ixodes species of tick, also known as the deer tick, causes Lyme disease. HOW TO REMOVE A TICK -- The proper way to remove a tick is to use a set of fine tweezers and  the tick as close to the skin as is possible. Do not use a smoldering match or cigarette, nail polish, petroleum jelly (eg, Vaseline), liquid soap, or kerosene because they may irritate the tick and cause it to behave like a syringe, injecting bodily fluids into the wound. The proper technique for tick removal includes the following:  ?Use fine tweezers to grasp the tick as close to the skin surface as possible. ?Pull backwards gently but firmly, using an even, steady pressure. Do not jerk or twist.  Do not squeeze, crush, or puncture the body of the tick, since its bodily fluids may contain infection-causing organisms. ? After removing the tick, wash the skin and hands thoroughly with soap and water. ?If any mouth parts of the tick remain in the skin, these should be left alone; they will be expelled on their own. Attempts to remove these parts may result in significant skin trauma. AFTER THE TICK IS REMOVED  Tick characteristics -- It is helpful if the person can provide information about the size of the tick, whether it was actually attached to the skin, if it was engorged (that is, full of blood), and how long it was attached. ?The size and color of the tick help to determine what kind of tick it was. ?Ticks that are brown and approximately the size of a poppy seed or pencil point are deer ticks.  These can transmit Borrelia burgdorferi (the bacterium that causes Lyme disease) and a number of other tick-borne infections, including babesiosis and anaplasmosis. Borrelia burgdorferi infected deer ticks live primarily in the [facility] and [place] region ([place] to [place]) and in [address] [place] [address] ([place]) region of the [place], and less commonly in the [place] ([address] [place] [address]). ?Ticks that are brown with a white collar and about the size of a pencil eraser are more likely to be dog ticks (Dermacentor species). These ticks do not carry Lyme disease, but can rarely carry another tick-borne infection called [facility] spotted fever that can be serious or even fatal.  ?A tick that was not attached, was easy to remove or just walking on the skin, and was still flat and tiny and not full of blood when it was removed could not have transmitted Lyme disease or any other infection since it had not yet taken a blood meal.  ?Only ticks that are attached and have finished feeding or are near the end of their meal can transmit Lyme disease. After arriving on the skin, the tick that spreads Lyme disease usually takes 24 hours before feeding begins. ? Even if a tick is attached, it must have taken a blood meal to transmit Lyme disease. At least 36 to 48 hours of feeding is required for a tick to have fed and then transmit the bacterium that causes Lyme disease. After this amount of time, the tick will be engorged (full of blood). An engorged tick has a globular shape and is larger than an unengorged one. ?It is not clear how long a tick needs to be attached to transmit bacteria other than Borrelia burgdorferi. 2) Make a lab only appointment when you want to get the PSA blood draw. Prostate-Specific Antigen (PSA) -  a test detecting PSA, a protein that can be produced by cancerous and noncancerous tissue in the prostate.  (The prostate is a gland that sits below the bladder and is responsible for secreting fluid in semen.) Normal range is 0.0 - 4.0ng/mL. Tick Bite: Care Instructions  Your Care Instructions    Ticks are small spiderlike animals. They bite to fasten themselves onto your skin and feed on your blood. Ticks can carry diseases. But most ticks do not carry diseases, and most tick bites do not cause serious health problems. Some people may have an allergic reaction to a tick bite. This reaction may be mild, with symptoms like itching and swelling. In rare cases, a severe allergic reaction may occur. Most of the time, all you need to do for a tick bite is relieve any symptoms you may have. Follow-up care is a key part of your treatment and safety. Be sure to make and go to all appointments, and call your doctor if you are having problems. It's also a good idea to know your test results and keep a list of the medicines you take. How can you care for yourself at home? · Put ice or a cold pack on the bite for 15 to 20 minutes once an hour. Put a thin cloth between the ice and your skin. · Try an over-the-counter medicine to relieve itching, redness, swelling, and pain. Be safe with medicines. Read and follow all instructions on the label. ¨ Take an antihistamine medicine, such as a nondrowsy one like loratadine (Claritin) or one that might make you sleepy like diphenhydramine (Benadryl). These medicines may help relieve itching, redness, and swelling. ¨ Use a spray of local anesthetic that contains benzocaine, such as Solarcaine. It may help relieve pain. If your skin reacts to the spray, stop using it. ¨ Put calamine lotion on the skin. It may help relieve itching. To avoid tick bites  · Avoid ticks:  ¨ Learn where ticks are found in your community, and stay away from those areas if possible. ¨ Cover as much of your body as possible when you work or play in grassy or wooded areas. ¨ Use insect repellents, such as products containing DEET. You can spray them on your skin.   ¨ Take steps to control ticks on your property if you live in an area where Lyme disease occurs. Clear leaves, brush, tall grasses, woodpiles, and stone fences from around your house and the edges of your yard or garden. This may help get rid of ticks. · When you come in from outdoors, check your body for ticks, including your groin, head, and underarms. The ticks may be about the size of a sesame seed. If no one else can help you check for ticks on your scalp, comb your hair with a fine-tooth comb. · If you find a tick, remove it quickly. Use tweezers to grasp the tick as close to its mouth (the part in your skin) as possible. Slowly pull the tick straight out-do not twist or yank-until its mouth releases from your skin. If part of the tick stays in the skin, leave it alone. It will likely come out on its own in a few days. · Ticks can come into your house on clothing, outdoor gear, and pets. These ticks can fall off and attach to you. ¨ Check your clothing and outdoor gear. Remove any ticks you find. Then put your clothing in a clothes dryer on high heat for 1 hour to kill any ticks that might remain. ¨ Check your pets for ticks after they have been outdoors. · When hiking in the woods, carry a small dry jar or ziplock bag. If you find a tick on your body, remove the tick and put it in the jar or bag. Store the container in the freezer so you can give it to your doctor if symptoms develop. The tick can be tested to learn whether it is carrying the bacteria that cause Lyme disease. When should you call for help? Call 911 anytime you think you may need emergency care. For example, call if:    · You have symptoms of a severe allergic reaction. These may include:  ¨ Sudden raised, red areas (hives) all over your body. ¨ Swelling of the throat, mouth, lips, or tongue. ¨ Trouble breathing. ¨ Passing out (losing consciousness).  Or you may feel very lightheaded or suddenly feel weak, confused, or restless.    Call your doctor now or seek immediate medical care if:    · You have signs of infection, such as:  ¨ Increased pain, swelling, warmth, or redness around the bite. ¨ Red streaks leading from the bite. ¨ Pus draining from the bite. ¨ A fever.    Watch closely for changes in your health, and be sure to contact your doctor if:    · You develop a new rash.     · You have joint pain.     · You are very tired.     · You have flu-like symptoms.     · You have symptoms for more than 1 week. Where can you learn more? Go to http://eladia-meaghan.info/. Enter W721 in the search box to learn more about \"Tick Bite: Care Instructions. \"  Current as of: November 20, 2017  Content Version: 11.7  © 5903-6917 [employer]. Care instructions adapted under license by [employer] (which disclaims liability or warranty for this information). If you have questions about a medical condition or this instruction, always ask your healthcare professional. [place] [address] any warranty or liability for your use of this information.

## 2018-07-19 NOTE — PROGRESS NOTES
Chief Complaint   Patient presents with    Skin Problem     Bite by tick a month ago     Shy Bermudez  Identified pt with two pt identifiers(name and ). Chief Complaint   Patient presents with    Skin Problem     Bite by tick a month ago       1. Have you been to the ER, urgent care clinic since your last visit?n  Hospitalized since your last visit? No    2. Have you seen or consulted any other health care providers outside of the [address] [place] [address] since your last visit?n  Include any pap smears or colon screening. No    Today's provider has been notified of reason for visit, vitals and flowsheets obtained on patients. Reviewed record In preparation for visit, huddled with provider and have obtained necessary documentation      There are no preventive care reminders to display for this patient.     Wt Readings from Last 3 Encounters:   18 201 lb 9.6 oz (91.4 kg)   18 203 lb 12.8 oz (92.4 kg)   18 201 lb 12.8 oz (91.5 kg)     Temp Readings from Last 3 Encounters:   18 96.1 °F (35.6 °C) (Oral)   18 95.9 °F (35.5 °C) (Oral)   18 96.8 °F (36 °C) (Oral)     BP Readings from Last 3 Encounters:   18 107/74   18 123/68   18 118/82     Pulse Readings from Last 3 Encounters:   18 84   18 85   18 84     Vitals:    18 1625   BP: 107/74   Pulse: 84   Resp: 19   Temp: 96.1 °F (35.6 °C)   TempSrc: Oral   SpO2: 96%   Weight: 201 lb 9.6 oz (91.4 kg)   Height: 6' (1.829 m)   PainSc:   0 - No pain         Learning Assessment:  :     Learning Assessment 2018   PRIMARY LEARNER Patient   HIGHEST LEVEL OF EDUCATION - PRIMARY LEARNER  SOME COLLEGE   PRIMARY LANGUAGE ENGLISH   LEARNER PREFERENCE PRIMARY READING   ANSWERED BY self   RELATIONSHIP SELF       Depression Screening:  :     PHQ over the last two weeks 2018   Little interest or pleasure in doing things Not at all   Feeling down, depressed, irritable, or hopeless Not at all Total Score PHQ 2 0       Fall Risk Assessment:  :     No flowsheet data found. Abuse Screening:  :     No flowsheet data found. ADL Screening:  :     ADL Assessment 6/5/2018   Feeding yourself No Help Needed   Getting from bed to chair No Help Needed   Getting dressed No Help Needed   Bathing or showering No Help Needed   Walk across the room (includes cane/walker) No Help Needed   Using the telphone No Help Needed   Taking your medications No Help Needed   Preparing meals No Help Needed   Managing money (expenses/bills) No Help Needed   Moderately strenuous housework (laundry) No Help Needed   Shopping for personal items (toiletries/medicines) No Help Needed   Shopping for groceries No Help Needed   Driving No Help Needed   Climbing a flight of stairs No Help Needed   Getting to places beyond walking distances No Help Needed                 Medication reconciliation up to date and corrected with patient at this time.

## 2018-07-19 NOTE — MR AVS SNAPSHOT
[address] [place] 
[address] [place] [address] 
[phone] Patient: Mikal Calloway MRN: [id number] :1965 Visit Information Date & Time Provider Department Dept. Phone Encounter #  
 2018  4:20 PM Lakhwinder Ortiz NP [facility] [phone] [id number] Follow-up Instructions Return if symptoms worsen or fail to improve. Upcoming Health Maintenance Date Due DTaP/Tdap/Td series (1 - Tdap) 2018* FOBT Q 1 YEAR AGE 50-75 2019* Influenza Age 5 to Adult 2018 *Topic was postponed. The date shown is not the original due date. Allergies as of 2018  Review Complete On: 2018 By: Violet Saravia LPN Severity Noted Reaction Type Reactions Amitriptyline  2017    Rash Levofloxacin  2017    Shortness of Breath, Swelling Mold  2018    Itching Current Immunizations  Reviewed on 2018 No immunizations on file. Not reviewed this visit You Were Diagnosed With   
  
 Codes Comments Tick bite, initial encounter    -  Primary ICD-10-CM: [address] [facility] [place] ICD-9-CM: 919.4, E906.4 Vitals BP Pulse Temp Resp Height(growth percentile) Weight(growth percentile) 107/74 (BP 1 Location: Left arm, BP Patient Position: Sitting) 84 96.1 °F (35.6 °C) (Oral) 19 6' (1.829 m) 201 lb 9.6 oz (91.4 kg) SpO2 BMI Smoking Status 96% 27.34 kg/m2 Never Smoker BMI and BSA Data Body Mass Index Body Surface Area  
 27.34 kg/m 2 2.15 m 2 Preferred Pharmacy Pharmacy Name Phone [id number] K[place] Marie [address] [place] [address] [facility] [phone] Your Updated Medication List  
  
   
This list is accurate as of 18  4:44 PM.  Always use your most recent med list.  
  
  
  
  
 codeine-butalbital-acetaminophen-caffeine -32-30 mg capsule Commonly known as:  FIORICET WITH CODEINE  
 Take 1 Cap by mouth every six (6) hours as needed for Headache. Max Daily Amount: 4 Caps. nystatin-triamcinolone topical cream  
Commonly known as:  MYCOLOG II Apply  to affected area two (2) times a day. triamcinolone 55 mcg nasal inhaler Commonly known as:  NASACORT AQ  
1 [place]. Follow-up Instructions Return if symptoms worsen or fail to improve. Patient Instructions 1) Tick bite - Please monitor for signs or symptoms of infection including redness, warmth, swelling, discharge and/or fever/chills. There are many different types of ticks in the [place], some of which are capable of transmitting infections. The risk of developing these infections depends upon the geographic location, season of the year, type of tick, and, for Lyme disease, how long the tick was attached to the skin. While many people are concerned after being bitten by a tick, the risk of acquiring a tick-borne infection is quite low, even if the tick has been attached, fed, and is actually carrying an infectious agent. Ticks transmit infection only after they have attached and then taken a blood meal from their new host. A tick that has not attached (and therefore has not yet become engorged from its blood meal) has not passed any infection. Since the deer tick that transmits Lyme disease must feed for >36 hours before transmission of the spirochete, the risk of acquiring Lyme disease from an observed tick bite, for example, is only 1.2 to 1.4 percent, even in an area where the disease is common. The organism that causes Lyme disease, Borrelia burgdorferi, lies dormant in the inner aspect of the tick's midgut. The organism becomes active only after exposure to the warm blood meal entering the tick's gut. Once active, the organism enters the tick's salivary glands. As the tick feeds, it must get rid of excess water through the salivary glands.  Thus, the tick will literally salivate organisms into the wound, thereby passing the infection to the host. 
 
If a person is bitten by a deer tick (the type of tick that carries Lyme disease), a healthcare provider will likely advise one of two approaches: ?Observe and treat if signs or symptoms of infection develop ? Treat with a preventive antibiotic immediately There is no benefit of blood testing for Lyme disease at the time of the tick bite; even people who become infected will not have a positive blood test until approximately two to six weeks after the infection develops (post-tick bite). The history of the tick bite will largely determine which of these options is chosen. Before seeking medical attention, the affected person or household member should carefully remove the tick and make note of its appearance. Only the Ixodes species of tick, also known as the deer tick, causes Lyme disease. HOW TO REMOVE A TICK  The proper way to remove a tick is to use a set of fine tweezers and  the tick as close to the skin as is possible. Do not use a smoldering match or cigarette, nail polish, petroleum jelly (eg, Vaseline), liquid soap, or kerosene because they may irritate the tick and cause it to behave like a syringe, injecting bodily fluids into the wound. The proper technique for tick removal includes the following: 
?Use fine tweezers to grasp the tick as close to the skin surface as possible. ?Pull backwards gently but firmly, using an even, steady pressure. Do not jerk or twist. 
Do not squeeze, crush, or puncture the body of the tick, since its bodily fluids may contain infection-causing organisms. ? After removing the tick, wash the skin and hands thoroughly with soap and water. ?If any mouth parts of the tick remain in the skin, these should be left alone; they will be expelled on their own. Attempts to remove these parts may result in significant skin trauma. AFTER THE TICK IS REMOVED Tick characteristics  It is helpful if the person can provide information about the size of the tick, whether it was actually attached to the skin, if it was engorged (that is, full of blood), and how long it was attached. ?The size and color of the tick help to determine what kind of tick it was. ?Ticks that are brown and approximately the size of a poppy seed or pencil point are deer ticks. These can transmit Borrelia burgdorferi (the bacterium that causes Lyme disease) and a number of other tick-borne infections, including babesiosis and anaplasmosis. Borrelia burgdorferi infected deer ticks live primarily in the [facility] and [place] region ([place] to [place]) and in [address] [place] [address] ([place] and [place]) region of the [place], and less commonly in the [place] ([address] [place] [address]). ?Ticks that are brown with a white collar and about the size of a pencil eraser are more likely to be dog ticks (Dermacentor species). These ticks do not carry Lyme disease, but can rarely carry another tick-borne infection called [facility] spotted fever that can be serious or even fatal. ?A tick that was not attached, was easy to remove or just walking on the skin, and was still flat and tiny and not full of blood when it was removed could not have transmitted Lyme disease or any other infection since it had not yet taken a blood meal. ?Only ticks that are attached and have finished feeding or are near the end of their meal can transmit Lyme disease. After arriving on the skin, the tick that spreads Lyme disease usually takes 24 hours before feeding begins. ? Even if a tick is attached, it must have taken a blood meal to transmit Lyme disease. At least 36 to 48 hours of feeding is required for a tick to have fed and then transmit the bacterium that causes Lyme disease. After this amount of time, the tick will be engorged (full of blood).  An engorged tick has a globular shape and is larger than an unengorged one. ?It is not clear how long a tick needs to be attached to transmit bacteria other than Borrelia burgdorferi. 2) Make a lab only appointment when you want to get the PSA blood draw. Prostate-Specific Antigen (PSA) -  a test detecting PSA, a protein that can be produced by cancerous and noncancerous tissue in the prostate. (The prostate is a gland that sits below the bladder and is responsible for secreting fluid in semen.) Normal range is 0.0 - 4.0ng/mL. Tick Bite: Care Instructions Your Care Instructions Ticks are small spiderlike animals. They bite to fasten themselves onto your skin and feed on your blood. Ticks can carry diseases. But most ticks do not carry diseases, and most tick bites do not cause serious health problems. Some people may have an allergic reaction to a tick bite. This reaction may be mild, with symptoms like itching and swelling. In rare cases, a severe allergic reaction may occur. Most of the time, all you need to do for a tick bite is relieve any symptoms you may have. Follow-up care is a key part of your treatment and safety. Be sure to make and go to all appointments, and call your doctor if you are having problems. It's also a good idea to know your test results and keep a list of the medicines you take. How can you care for yourself at home? · Put ice or a cold pack on the bite for 15 to 20 minutes once an hour. Put a thin cloth between the ice and your skin. · Try an over-the-counter medicine to relieve itching, redness, swelling, and pain. Be safe with medicines. Read and follow all instructions on the label. ¨ Take an antihistamine medicine, such as a nondrowsy one like loratadine (Claritin) or one that might make you sleepy like diphenhydramine (Benadryl). These medicines may help relieve itching, redness, and swelling.  
¨ Use a spray of local anesthetic that contains benzocaine, such as Solarcaine. It may help relieve pain. If your skin reacts to the spray, stop using it. ¨ Put calamine lotion on the skin. It may help relieve itching. To avoid tick bites · Avoid ticks: 
¨ Learn where ticks are found in your community, and stay away from those areas if possible. ¨ Cover as much of your body as possible when you work or play in grassy or wooded areas. ¨ Use insect repellents, such as products containing DEET. You can spray them on your skin. ¨ Take steps to control ticks on your property if you live in an area where Lyme disease occurs. Clear leaves, brush, tall grasses, woodpiles, and stone fences from around your house and the edges of your yard or garden. This may help get rid of ticks. · When you come in from outdoors, check your body for ticks, including your groin, head, and underarms. The ticks may be about the size of a sesame seed. If no one else can help you check for ticks on your scalp, comb your hair with a fine-tooth comb. · If you find a tick, remove it quickly. Use tweezers to grasp the tick as close to its mouth (the part in your skin) as possible. Slowly pull the tick straight out-do not twist or yank-until its mouth releases from your skin. If part of the tick stays in the skin, leave it alone. It will likely come out on its own in a few days. · Ticks can come into your house on clothing, outdoor gear, and pets. These ticks can fall off and attach to you. ¨ Check your clothing and outdoor gear. Remove any ticks you find. Then put your clothing in a clothes dryer on high heat for 1 hour to kill any ticks that might remain. ¨ Check your pets for ticks after they have been outdoors. · When hiking in the woods, carry a small dry jar or ziplock bag. If you find a tick on your body, remove the tick and put it in the jar or bag. Store the container in the freezer so you can give it to your doctor if symptoms develop.  The tick can be tested to learn whether it is carrying the bacteria that cause Lyme disease. When should you call for help? Call 911 anytime you think you may need emergency care. For example, call if: 
  · You have symptoms of a severe allergic reaction. These may include: 
¨ Sudden raised, red areas (hives) all over your body. ¨ Swelling of the throat, mouth, lips, or tongue. ¨ Trouble breathing. ¨ Passing out (losing consciousness). Or you may feel very lightheaded or suddenly feel weak, confused, or restless.  
 Call your doctor now or seek immediate medical care if: 
  · You have signs of infection, such as: 
¨ Increased pain, swelling, warmth, or redness around the bite. ¨ Red streaks leading from the bite. ¨ Pus draining from the bite. ¨ A fever.  
 Watch closely for changes in your health, and be sure to contact your doctor if: 
  · You develop a new rash.  
  · You have joint pain.  
  · You are very tired.  
  · You have flu-like symptoms.  
  · You have symptoms for more than 1 week. Where can you learn more? Go to http://eladia-meaghan.info/. Enter Y594 in the search box to learn more about \"Tick Bite: Care Instructions. \" Current as of: November 20, 2017 Content Version: 11.7 © 6811-3323 [employer]. Care instructions adapted under license by [employer] (which disclaims liability or warranty for this information). If you have questions about a medical condition or this instruction, always ask your healthcare professional. [place] [address] any warranty or liability for your use of this information. Introducing \[facility]\"" & HEALTH SERVICES! Morgan Jason introduces [employer] Mobile patient portal. Now you can access parts of your medical record, email your doctor's office, and request medication refills online. 1. In your internet browser, go to [email or web address] 2. Click on the First Time User? Click Here link in the Sign In box.  You will see the New Member Sign Up page. 3. Enter your Lobster Access Code exactly as it appears below. You will not need to use this code after youve completed the sign-up process. If you do not sign up before the expiration date, you must request a new code. · Lobster Access Code: [email or web address] Expires: 7/25/2018  4:08 PM 
 
4. Enter the last four digits of your Social Security Number (xxxx) and Date of Birth (mm/dd/yyyy) as indicated and click Submit. You will be taken to the next sign-up page. 5. Create a Kaiamt ID. This will be your Lobster login ID and cannot be changed, so think of one that is secure and easy to remember. 6. Create a Lobster password. You can change your password at any time. 7. Enter your Password Reset Question and Answer. This can be used at a later time if you forget your password. 8. Enter your e-mail address. You will receive e-mail notification when new information is available in [address]. 9. Click Sign Up. You can now view and download portions of your medical record. 10. Click the Download Summary menu link to download a portable copy of your medical information. If you have questions, please visit the Frequently Asked Questions section of the Lobster website. Remember, Lobster is NOT to be used for urgent needs. For medical emergencies, dial 911. Now available from your iPhone and Android! Please provide this summary of care documentation to your next provider. Your primary care clinician is listed as Toni Craft. If you have any questions after today's visit, please call [phone].

## 2018-11-19 ENCOUNTER — OFFICE VISIT (OUTPATIENT)
Dept: FAMILY MEDICINE CLINIC | Age: 53
End: 2018-11-19

## 2018-11-19 VITALS
TEMPERATURE: 98 F | SYSTOLIC BLOOD PRESSURE: 145 MMHG | BODY MASS INDEX: 27.33 KG/M2 | RESPIRATION RATE: 17 BRPM | OXYGEN SATURATION: 95 % | DIASTOLIC BLOOD PRESSURE: 83 MMHG | HEIGHT: 72 IN | WEIGHT: 201.8 LBS | HEART RATE: 93 BPM

## 2018-11-19 DIAGNOSIS — H50.00 CROSS EYED: Primary | ICD-10-CM

## 2018-11-19 DIAGNOSIS — Z23 ENCOUNTER FOR IMMUNIZATION: ICD-10-CM

## 2018-11-19 NOTE — PROGRESS NOTES
Alice Lopez  Identified pt with two pt identifiers(name and ). Chief Complaint Patient presents with [address] [facility]  
  rm 9/non fasting/ eye drifted once lasted several minutes 1. Have you been to the ER, urgent care clinic since your last visit? Hospitalized since your last visit? no 
 
2. Have you seen or consulted any other health care providers outside of the [address] [place] [address] since your last visit? Include any pap smears or colon screening. no 
 
 
Advance Care Planning In the event something were to happen to you and you were unable to speak on your behalf, do you have an Advance Directive/ Living Will in place stating your wishes? NO If yes, do we have a copy on file NO If no, would you like information NO Medication reconciliation up to date and corrected with patient at this time. Today's provider has been notified of reason for visit, vitals and flowsheets obtained on patients. Reviewed record in preparation for visit, huddled with provider and have obtained necessary documentation. Health Maintenance Due Topic  Shingrix Vaccine Age 50> (1 of 2) Wt Readings from Last 3 Encounters:  
18 201 lb 9.6 oz (91.4 kg) 18 203 lb 12.8 oz (92.4 kg) 18 201 lb 12.8 oz (91.5 kg) Temp Readings from Last 3 Encounters:  
18 96.1 °F (35.6 °C) (Oral) 18 95.9 °F (35.5 °C) (Oral) 18 96.8 °F (36 °C) (Oral) BP Readings from Last 3 Encounters:  
18 107/74  
18 123/68  
18 118/82 Pulse Readings from Last 3 Encounters:  
18 84  
18 85  
18 84 There were no vitals filed for this visit. Learning Assessment: 
:  
 
Learning Assessment 2018 PRIMARY LEARNER Patient Patient HIGHEST LEVEL OF EDUCATION - PRIMARY LEARNER  Mel Crabtree [address] SOME COLLEGE PRIMARY LANGUAGE ENGLISH ENGLISH  
LEARNER PREFERENCE PRIMARY DEMONSTRATION READING  
ANSWERED BY Patient self RELATIONSHIP SELF SELF Depression Screening: 
:  
 
PHQ over the last two weeks 11/19/2018 Little interest or pleasure in doing things Not at all Feeling down, depressed, irritable, or hopeless Not at all Total Score PHQ 2 0 No flowsheet data found. Fall Risk Assessment: 
:  
 
No flowsheet data found. Abuse Screening: 
:  
 
Abuse Screening Questionnaire 11/19/2018 Do you ever feel afraid of your partner? Makaylamichelle Calderón Are you in a relationship with someone who physically or mentally threatens you? Makayla Calderón Is it safe for you to go home? Y  
 
 
ADL Screening: 
:  
 
ADL Assessment 6/5/2018 Feeding yourself No Help Needed Getting from bed to chair No Help Needed Getting dressed No Help Needed Bathing or showering No Help Needed Walk across the room (includes cane/walker) No Help Needed Using the telphone No Help Needed Taking your medications No Help Needed Preparing meals No Help Needed Managing money (expenses/bills) No Help Needed Moderately strenuous housework (laundry) No Help Needed Shopping for personal items (toiletries/medicines) No Help Needed Shopping for groceries No Help Needed Driving No Help Needed Climbing a flight of stairs No Help Needed Getting to places beyond walking distances No Help Needed BMI: 
Weight Metrics 7/19/2018 6/29/2018 6/5/2018 5/16/2018 4/26/2018 Weight 201 lb 9.6 oz 203 lb 12.8 oz 201 lb 12.8 oz 202 lb 4.8 oz 208 lb BMI 27.34 kg/m2 27.64 kg/m2 27.37 kg/m2 27.44 kg/m2 28.21 kg/m2 Medication reconciliation up to date and corrected with patient at this time.

## 2018-11-19 NOTE — PROGRESS NOTES
Subjective:  
Josef Huitron is a 47 y.o. male who complains of having his eyes go \"cross-eyed\" for 2-3 minutes for one episode on 11/16/2018. Symptoms occurred when he was at work. He works on metal cutting machines. This has never happened before. No headache. He felt like he could not control where his eyes were looking. He wears glasses, has a current prescription from about one year ago. He sees his eye doctor annually. He says he has cataracts so always has slightly blurry vision, but otherwise no change in his baseline vision. No dizziness. No presyncope. No extremity weakness. No h/o head trauma. No h/o stroke. He has h/o migraines. Has migraine about 2-3 times per month, for which he uses fioricet. No change in his usual migraine pattern. He has not seen neurology as an adult. He may have seen neurology as a child since his migraines started as a teenager, as he recalls seeing many specialists at the time. He would like to have the Shingrix vaccine. Past Medical History:  
Diagnosis Date  Blood clot associated with vein wall inflammation 2014  Giant cell tumor   
 leg Social History Tobacco Use  Smoking status: Never Smoker  Smokeless tobacco: Never Used Substance Use Topics  Alcohol use: No  
 Drug use: No  
 
Outpatient Medications Marked as Taking for the 11/19/18 encounter (Office Visit) with Delilah Mustafa PA-C Medication Sig Dispense Refill  codeine-butalbital-acetaminophen-caffeine (FIORICET WITH CODEINE) -56-30 mg capsule Take 1 Cap by mouth every six (6) hours as needed for Headache. Max Daily Amount: 4 Caps. 30 Cap 1  
 nystatin-triamcinolone (MYCOLOG II) topical cream Apply  to affected area two (2) times a day. 60 g 1  
 triamcinolone (NASACORT AQ) 55 mcg nasal inhaler 1 Spray. Allergies Allergen Reactions  Amitriptyline Rash  Levofloxacin Shortness of Breath and Swelling  Mold Itching Review of Systems A comprehensive review of systems was negative except for that written in the HPI. Objective:  
 
Visit Vitals /83 (BP 1 Location: Left arm, BP Patient Position: Sitting) Pulse 93 Temp 98 °F (36.7 °C) (Oral) Resp 17 Ht 6' (1.829 m) Wt 201 lb 12.8 oz (91.5 kg) SpO2 95% BMI 27.37 kg/m² General:   alert, cooperative Eyes: conjunctivae/scleras clear. Pupils asymmetric with left pupil slightly more dilated, both pupils equally reactive to light and accomodation, normal cover-uncover test, EOM's intact Ears: External auditory canals clear, tympanic membranes clear Sinuses/Nose: No maxillary or frontal tenderness. clear rhinorrhea present. Mouth:  No oral lesions, no pharyngeal erythema, no exudates Neck: Supple, trachea midline Heart: S1 and S2 normal,no murmurs noted Lungs: Clear to auscultation bilaterally, no increased work of breathing Abdomen: Soft, nontender. Normal bowel sounds Neuro: Cranial nerves intact. Normal strength and movement in all extremities. Sensation intact and symmetric. Normal finger to nose, rapid alternating movements, heel-toe, heel-shin and Rhomberg. Extremities: No edema or cyanosis No results found for this visit on 11/19/18. Assessment/Plan: ICD-10-CM ICD-9-CM 1. Cross eyed - one episode of transient loss of eye muscle control, left pupil appears slightly larger ;unsure if old or new, otherwise neurologically intact without recurrent or other neurological symptoms, uncertain etiology, refer to neurology for further evaluation H50.00 378.00 REFERRAL TO NEUROLOGY 2. Encounter for immunization Z23 V03.89 varicella-zoster recombinant, PF, (SHINGRIX, PF,) 50 mcg/0.5 mL susr injection Verbal and written instructions (see AVS) provided. Patient expresses understanding of diagnosis and treatment plan.

## 2018-11-19 NOTE — PATIENT INSTRUCTIONS
DASH Diet: Care Instructions Your Care Instructions The DASH diet is an eating plan that can help lower your blood pressure. DASH stands for Dietary Approaches to Stop Hypertension. Hypertension is high blood pressure. The DASH diet focuses on eating foods that are high in calcium, potassium, and magnesium. These nutrients can lower blood pressure. The foods that are highest in these nutrients are fruits, vegetables, low-fat dairy products, nuts, seeds, and legumes. But taking calcium, potassium, and magnesium supplements instead of eating foods that are high in those nutrients does not have the same effect. The DASH diet also includes whole grains, fish, and poultry. The DASH diet is one of several lifestyle changes your doctor may recommend to lower your high blood pressure. Your doctor may also want you to decrease the amount of sodium in your diet. Lowering sodium while following the DASH diet can lower blood pressure even further than just the DASH diet alone. Follow-up care is a key part of your treatment and safety. Be sure to make and go to all appointments, and call your doctor if you are having problems. It's also a good idea to know your test results and keep a list of the medicines you take. How can you care for yourself at home? Following the DASH diet · Eat 4 to 5 servings of fruit each day. A serving is 1 medium-sized piece of fruit, ½ cup chopped or canned fruit, 1/4 cup dried fruit, or 4 ounces (½ cup) of fruit juice. Choose fruit more often than fruit juice. · Eat 4 to 5 servings of vegetables each day. A serving is 1 cup of lettuce or raw leafy vegetables, ½ cup of chopped or cooked vegetables, or 4 ounces (½ cup) of vegetable juice. Choose vegetables more often than vegetable juice. · Get 2 to 3 servings of low-fat and fat-free dairy each day. A serving is 8 ounces of milk, 1 cup of yogurt, or 1 ½ ounces of cheese. · Eat 6 to 8 servings of grains each day. A serving is 1 slice of bread, 1 ounce of dry cereal, or ½ cup of cooked rice, pasta, or cooked cereal. Try to choose whole-grain products as much as possible. · Limit lean meat, poultry, and fish to 2 servings each day. A serving is 3 ounces, about the size of a deck of cards. · Eat 4 to 5 servings of nuts, seeds, and legumes (cooked dried beans, lentils, and split peas) each week. A serving is 1/3 cup of nuts, 2 tablespoons of seeds, or ½ cup of cooked beans or peas. · Limit fats and oils to 2 to 3 servings each day. A serving is 1 teaspoon of vegetable oil or 2 tablespoons of salad dressing. · Limit sweets and added sugars to 5 servings or less a week. A serving is 1 tablespoon jelly or jam, ½ cup sorbet, or 1 cup of lemonade. · Eat less than 2,300 milligrams (mg) of sodium a day. If you limit your sodium to 1,500 mg a day, you can lower your blood pressure even more. Tips for success · Start small. Do not try to make dramatic changes to your diet all at once. You might feel that you are missing out on your favorite foods and then be more likely to not follow the plan. Make small changes, and stick with them. Once those changes become habit, add a few more changes. · Try some of the following: ? Make it a goal to eat a fruit or vegetable at every meal and at snacks. This will make it easy to get the recommended amount of fruits and vegetables each day. ? Try yogurt topped with fruit and nuts for a snack or healthy dessert. ? Add lettuce, tomato, cucumber, and onion to sandwiches. ? Combine a ready-made pizza crust with low-fat mozzarella cheese and lots of vegetable toppings. Try using tomatoes, squash, spinach, broccoli, carrots, cauliflower, and onions. ? Have a variety of cut-up vegetables with a low-fat dip as an appetizer instead of chips and dip. ? Sprinkle sunflower seeds or chopped almonds over salads.  Or try adding chopped walnuts or almonds to cooked vegetables. ? Try some vegetarian meals using beans and peas. Add garbanzo or kidney beans to salads. Make burritos and tacos with mashed siegel beans or black beans. Where can you learn more? Go to http://eladia-meaghan.info/. Enter C620 in the search box to learn more about \"DASH Diet: Care Instructions. \" Current as of: December 6, 2017 Content Version: 11.8 © 9424-4811 [employer]. Care instructions adapted under license by [employer] (which disclaims liability or warranty for this information). If you have questions about a medical condition or this instruction, always ask your healthcare professional. [address] any warranty or liability for your use of this information.

## 2018-12-13 ENCOUNTER — OFFICE VISIT (OUTPATIENT)
Dept: FAMILY MEDICINE CLINIC | Age: 53
End: 2018-12-13

## 2018-12-13 VITALS
DIASTOLIC BLOOD PRESSURE: 74 MMHG | OXYGEN SATURATION: 99 % | HEIGHT: 72 IN | RESPIRATION RATE: 17 BRPM | TEMPERATURE: 97.8 F | BODY MASS INDEX: 27.6 KG/M2 | SYSTOLIC BLOOD PRESSURE: 141 MMHG | WEIGHT: 203.8 LBS | HEART RATE: 108 BPM

## 2018-12-13 DIAGNOSIS — L29.9 ITCHING: Primary | ICD-10-CM

## 2018-12-13 RX ORDER — CHLORPHENIRAMINE MALEATE 4 MG
TABLET ORAL 2 TIMES DAILY
Qty: 15 G | Refills: 0 | Status: SHIPPED | OUTPATIENT
Start: 2018-12-13 | End: 2022-07-06 | Stop reason: ALTCHOICE

## 2018-12-13 RX ORDER — ASPIRIN 325 MG
325 TABLET ORAL DAILY
COMMUNITY

## 2018-12-13 NOTE — PROGRESS NOTES
Alice Lopez  Identified pt with two pt identifiers(name and ). Chief Complaint   Patient presents with    Foot Pain     rm 10/bi lateral feet/ bottom bi lateral feet red and peeling       1. Have you been to the ER, urgent care clinic since your last visit? Hospitalized since your last visit? no    2. Have you seen or consulted any other health care providers outside of the [address] [place] [address] since your last visit? Include any pap smears or colon screening. no      Advance Care Planning    In the event something were to happen to you and you were unable to speak on your behalf, do you have an Advance Directive/ Living Will in place stating your wishes? NO    If yes, do we have a copy on file NO    If no, would you like information NO    Medication reconciliation up to date and corrected with patient at this time. Today's provider has been notified of reason for visit, vitals and flowsheets obtained on patients. Reviewed record in preparation for visit, huddled with provider and have obtained necessary documentation. Health Maintenance Due   Topic    Shingrix Vaccine Age 50> (1 of 2)       Wt Readings from Last 3 Encounters:   18 201 lb 12.8 oz (91.5 kg)   18 201 lb 9.6 oz (91.4 kg)   18 203 lb 12.8 oz (92.4 kg)     Temp Readings from Last 3 Encounters:   18 98 °F (36.7 °C) (Oral)   18 96.1 °F (35.6 °C) (Oral)   18 95.9 °F (35.5 °C) (Oral)     BP Readings from Last 3 Encounters:   18 145/83   18 107/74   18 123/68     Pulse Readings from Last 3 Encounters:   18 93   18 84   18 85     There were no vitals filed for this visit.       Learning Assessment:  :     Learning Assessment 2018   PRIMARY LEARNER Patient Patient   HIGHEST LEVEL OF EDUCATION - PRIMARY LEARNER  [employer] SOME COLLEGE   PRIMARY LANGUAGE ENGLISH ENGLISH   LEARNER PREFERENCE PRIMARY DEMONSTRATION READING   ANSWERED BY Patient self RELATIONSHIP SELF SELF       Depression Screening:  :     PHQ over the last two weeks 11/19/2018   Little interest or pleasure in doing things Not at all   Feeling down, depressed, irritable, or hopeless Not at all   Total Score PHQ 2 0       No flowsheet data found. Fall Risk Assessment:  :     No flowsheet data found. Abuse Screening:  :     Abuse Screening Questionnaire 11/19/2018   Do you ever feel afraid of your partner? N   Are you in a relationship with someone who physically or mentally threatens you? N   Is it safe for you to go home? Y       ADL Screening:  :     ADL Assessment 6/5/2018   Feeding yourself No Help Needed   Getting from bed to chair No Help Needed   Getting dressed No Help Needed   Bathing or showering No Help Needed   Walk across the room (includes cane/walker) No Help Needed   Using the telphone No Help Needed   Taking your medications No Help Needed   Preparing meals No Help Needed   Managing money (expenses/bills) No Help Needed   Moderately strenuous housework (laundry) No Help Needed   Shopping for personal items (toiletries/medicines) No Help Needed   Shopping for groceries No Help Needed   Driving No Help Needed   Climbing a flight of stairs No Help Needed   Getting to places beyond walking distances No Help Needed           BMI:  Weight Metrics 11/19/2018 7/19/2018 6/29/2018 6/5/2018 5/16/2018 4/26/2018   Weight 201 lb 12.8 oz 201 lb 9.6 oz 203 lb 12.8 oz 201 lb 12.8 oz 202 lb 4.8 oz 208 lb   BMI 27.37 kg/m2 27.34 kg/m2 27.64 kg/m2 27.37 kg/m2 27.44 kg/m2 28.21 kg/m2           Medication reconciliation up to date and corrected with patient at this time.

## 2018-12-13 NOTE — PROGRESS NOTES
S: Olu Bernard is a 47 y.o. male who presents for foot itching    Assessment/Plan:    1. Athlete's foot  -bilat feet: scaly, erythema rash with white patches scattered bg toes and soles  -rx: clotrimazole bid to affected areas  -supportive instructions given   -if fungus persists, can refer to podiatry    2. Elevated BP reading  -advised to monitor BP at home and keep log; if >140/90 or <110/60, make OV       HPI:    bilat feet - red, cracked with white on toes/soles  +itching  No pain  No numbness/Tingling  Hx of bilat great toenails removal   Wears work boots - until last week when laid off  Will take socks off at home on occasion     BP elevated:  BP at home - 134/76, 134/76, 133/82, 108/76  BP today = 141/74, pulse = 108  Pulse reduced to 90 during OV    PHQ over the last two weeks 11/19/2018   Little interest or pleasure in doing things Not at all   Feeling down, depressed, irritable, or hopeless Not at all   Total Score PHQ 2 0     Social History:  Occupation: laid off from job last week; has been looking for new one this past week  Nutrition: was drinking protein shakes, noticed BP increased after drinking them; had one this am, but will stop     Social History     Tobacco Use   Smoking Status Never Smoker   Smokeless Tobacco Never Used     Social History     Substance and Sexual Activity   Alcohol Use No     Social History     Substance and Sexual Activity   Drug Use No       Review of Systems:  - Constitutional Symptoms: no fevers, chills, weight loss  - Cardiovascular: no chest pain or palpitations  - Respiratory: no cough or shortness of breath  - Integumentary: + rashes   ROS is negative otherwise. I reviewed the following:     Past Medical History:   Diagnosis Date    Blood clot associated with vein wall inflammation 2014    Giant cell tumor     leg       Current Outpatient Medications   Medication Sig Dispense Refill    aspirin (ASPIRIN) 325 mg tablet Take 325 mg by mouth daily.       codeine-butalbital-acetaminophen-caffeine (FIORICET WITH CODEINE) -48-30 mg capsule Take 1 Cap by mouth every six (6) hours as needed for Headache. Max Daily Amount: 4 Caps. 30 Cap 1    nystatin-triamcinolone (MYCOLOG II) topical cream Apply  to affected area two (2) times a day. 60 g 1    triamcinolone (NASACORT AQ) 55 mcg nasal inhaler 1 Spray. Allergies   Allergen Reactions    Levofloxacin Shortness of Breath and Swelling    Mold Itching    Amitriptyline Rash        O: VS:   Visit Vitals  /74 (BP 1 Location: Right arm, BP Patient Position: Sitting)   Pulse (!) 108   Temp 97.8 °F (36.6 °C) (Oral)   Resp 17   Ht 6' (1.829 m)   Wt 203 lb 12.8 oz (92.4 kg)   SpO2 99%   BMI 27.64 kg/m²       [facility] is in no acute distress. Non-toxic. Well nourished. Well developed. Appropriately groomed. RESP: Breath sounds are symmetrical bilaterally. Unlabored without SOB. Speaking in full sentences. Clear to auscultation bilaterally anteriorly and posteriorly. No wheezes. No rales or rhonchi. CV: normal rate. Regular rhythm. S1, S2 audible. No murmur noted. No rubs, clicks or gallops noted. HEME/LYMPH: peripheral pulses palpable 2+ x 4 extremities. Cap refill: 2+ , 2+ bilat pedal pulses  NEURO: awake, alert and oriented to person, place, and time and event. Sensation is intact to light touch bilaterally. SKIN: Skin is warm and dry. Turgor is normal. No petechiae, no purpura, no rash. Bilat feet: scaly, erythema rash with white patches scattered bg toes and soles  PSYCH: appropriate behavior, dress and thought processes. Good eye contact. Clear and coherent speech  _____________________________________________________________________  Patient education was done. Advised on nutrition, physical activity, tobacco, alcohol and safety. Counseling included discussion of diagnosis, differentials, treatment options, prescribed treatment, warning signs and follow up.  Medication risks/benefits, interactions and alternatives discussed with patient.      Patient verbalized understanding and agreed to plan of care. Patient was given an after visit summary which included current diagnoses, medications and vital signs. Follow up as needed or if symptoms progress.

## 2018-12-13 NOTE — PATIENT INSTRUCTIONS
1)  Foot Fungus  Apply lotrimin ointment twice a day. (Prescription sent in, but may be cheaper over the counter). Fungi are naturally found on the skin and in the body, along with bacteria. A fungal foot infection is caused by an overgrowth of fungi. Fungi like warm, moist environments, so feet (especially if you wear socks with sneakers, boots) are perfect for fungi to grow. People at higher risk include diabetics, people over 65, poor circulation, skin or nail injury, moist fingers or toes for long periods of time, weakened immune systems. It is difficult to cure nail fungal infections and usually takes several months for nail fungal infections to go away, so preventing nail fungus is important. Home remedy   Soak your feet 50/50 vinegar and water solution twice daily for 15 minutes. Apply Timbo's Vaporub to toenail base twice daily. To prevent fungi infections, use antifungal sprays or powders regularly, wash hands and manicure tools after use, dry feet well after showering, wear socks that allow feet to breathe and minimize moisture, don't wear artificial nails or nail polish. If infection persists, we can put in a referral for podiatry for further evaluation. Antifungals (On the skin)   Treat fungus infections of the skin. Some of these infections are \"athlete's foot,\" \"jock itch,\" and yeast infections. Antifungal shampoo is used to treat dandruff. Brand Name(s): Aloe Vesta Antifungal, Anti-Fungal Powder, Antifungal, Athlete's Foot, Athlete's Foot Cream, Athlete's Foot Powder, Azolen, Bactivex Athlete's Foot & Cracked Heels, Loida Antifungal, Blis-To-Sol, Breezee Mist Foot Powder, CNL8, Cecil Antifungal, Ciclodan, Ciclodan Cream Kit   There may be other brand names for this medicine. When This Medicine Should Not Be Used:    You should not use this medicine if you have ever had an allergic reaction to antifungals such as econazole, ketoconazole, miconazole, butoconazole, clotrimazole, or terconazole. Unless your doctor tells you to, you should not use topical antifungals on children under 3years of age. You should not use for diaper rash. How to Use This Medicine:   Cream, Powder, Spray, Foam, Shampoo, Lotion, Gel/Jelly, Ointment, Spray, Liquid, Fizzy Tablet, Powder for Solution  · Your doctor will tell you how much medicine to use. Do not use more than directed. · Follow the instructions on the medicine label if you are using this medicine without a prescription. · Wash your hands with soap and water before and after you use this medicine. · If you are using this medicine with a prescription, the medicine might come with patient instructions. Read and follow these instructions carefully. Ask your doctor or pharmacist if you have any questions. · Use this medicine only on your skin. Rinse it off right away if it gets on a cut or scrape. Do not get the medicine in your eyes, nose, or mouth. · To use the cream, liquid, powder, lotion, gel, or ointment: Before putting this medicine on, wash the skin with soap and water, and then dry with a towel. Apply a thin layer of the medicine to the affected area. Rub it in gently. Use on both feet if you have athlete's foot. You might need to shake the liquid or lotion before using it. · To use the powder: Before putting this medicine on, wash the skin with soap and water, and then dry with a towel. Apply a thin layer to the affected area. Use on both feet and inside your socks if you have athlete's foot. · To use the spray: Shake the spray can just before using. Hold the can 6 to 10 inches from the skin and spray. If you use the aerosol spray on your feet, spray it on the top, bottom, and between the toes of both feet. Also spray some in your socks and shoes (treat both feet the same). · To use the shampoo: Wet hair and scalp thoroughly with water.  Work the shampoo into a lather and gently massage it over your whole scalp for about 1 minute. Rinse the hair thoroughly. Wash hair again and leave the shampoo on your scalp for another 3 minutes. Shampoo every 4 days for 4 weeks. Always allow at least 3 days between using the shampoo. · Keep using the medicine for as long as your doctor has told you to, even if you think your symptoms are gone. If you do not use your medicine for the full treatment time, your infection may return. · Do not inhale the aerosol spray or use it near heat, open flame, or while smoking. Do not puncture, break, or burn the aerosol can. · Throw away the pad or swab after using it. Do not save an opened pad or swab to use later. · Do not cover the treated area with a bandage unless directed by your doctor. If a dose is missed:   · Take a dose as soon as you remember. If it is almost time for your next dose, wait until then and take a regular dose. Do not take extra medicine to make up for a missed dose. How to Store and Dispose of This Medicine:   · Store the medicine in a closed container at room temperature, away from heat, moisture, and direct light. Do not freeze. · Keep all medicine away from children and never share your medicine with anyone. Drugs and Foods to Avoid:   Ask your doctor or pharmacist before using any other medicine, including over-the-counter medicines, vitamins, and herbal products. · You should not use other medicines on the same area of skin unless your doctor tells you to. · Do not put cosmetics or skin care products on the treated skin. Warnings While Using This Medicine:   · If you are pregnant (or may become pregnant) or breastfeeding, talk to your doctor before using this medicine. · The liquid and spray forms of this medicine might be flammable. Keep them away from high heat and open flames at all times. · If your symptoms do not get better after about a week, or get worse while using this medicine, call your doctor.   · Antifungal shampoo may remove the curls from hair that has been treated with a permanent. · Each antifungal medicine was made to treat a certain kind of infection. Use only the medicine your doctor has prescribed. You should not use an over-the-counter product in place of what your doctor has told you to use. Possible Side Effects While Using This Medicine:   Call your doctor right away if you notice any of these side effects:  · Itching, rash, swelling, or redness that was not there before you used this medicine  If you notice these less serious side effects, talk with your doctor:   · Stinging, burning, or redness where medicine is put on  · Dry or oily hair (with shampoo use)  If you notice other side effects that you think are caused by this medicine, tell your doctor. Call your doctor for medical advice about side effects. You may report side effects to FDA at 7-299-FDA-8234  © 2017 [facility] Information is for End User's use only and may not be sold, redistributed or otherwise used for commercial purposes. The above information is an  only. It is not intended as medical advice for individual conditions or treatments. Talk to your doctor, nurse or pharmacist before following any medical regimen to see if it is safe and effective for you.

## 2018-12-14 ENCOUNTER — TELEPHONE (OUTPATIENT)
Dept: FAMILY MEDICINE CLINIC | Age: 53
End: 2018-12-14

## 2018-12-14 NOTE — TELEPHONE ENCOUNTER
Writer called patient per Merline's request to see if he has made an appointment with Neuro as referred. Patient did not answer. Writer left  requesting phone call back.

## 2018-12-21 NOTE — TELEPHONE ENCOUNTER
Patient called into the office returning a call from Jesusita Cooper. Informed patient that Jesusita Cooper was following up to see if patient had seen a Neuro, patient was referred. Patient stated that he was laid off from work and has no money to pursue anything extra right now. Informed patient that I would let the nurse/provider know.

## 2018-12-24 NOTE — TELEPHONE ENCOUNTER
Writer called patient back was disconnected from call, name and  used for patient verification, Patient stated that a rash has developed from his eye down his cheek and is spreading to his neck. Writer advised patient to [facility] to [facility] to be evaluated and treated.

## 2019-07-16 ENCOUNTER — OFFICE VISIT (OUTPATIENT)
Dept: FAMILY MEDICINE CLINIC | Age: 54
End: 2019-07-16

## 2019-07-16 VITALS
RESPIRATION RATE: 18 BRPM | SYSTOLIC BLOOD PRESSURE: 117 MMHG | DIASTOLIC BLOOD PRESSURE: 72 MMHG | HEIGHT: 72 IN | WEIGHT: 202.7 LBS | TEMPERATURE: 97.5 F | OXYGEN SATURATION: 96 % | BODY MASS INDEX: 27.45 KG/M2 | HEART RATE: 78 BPM

## 2019-07-16 DIAGNOSIS — G43.009 MIGRAINE WITHOUT AURA AND WITHOUT STATUS MIGRAINOSUS, NOT INTRACTABLE: Primary | ICD-10-CM

## 2019-07-16 DIAGNOSIS — G44.211 INTRACTABLE EPISODIC TENSION-TYPE HEADACHE: ICD-10-CM

## 2019-07-16 NOTE — PROGRESS NOTES
[facility]  Identified pt with two pt identifiers(name and ). Chief Complaint   Patient presents with    Migraine     Rm 7         1. Have you been to the ER, urgent care clinic since your last visit? Hospitalized since your last visit? NO    2. Have you seen or consulted any other health care providers outside of the [address] [place] [address] since your last visit? Include any pap smears or colon screening. NO              Weight Metrics 2018   Weight 202 lb 11.2 oz 203 lb 12.8 oz 201 lb 12.8 oz 201 lb 9.6 oz 203 lb 12.8 oz 201 lb 12.8 oz 202 lb 4.8 oz   BMI 27.49 kg/m2 27.64 kg/m2 27.37 kg/m2 27.34 kg/m2 27.64 kg/m2 27.37 kg/m2 27.44 kg/m2         Medication reconciliation up to date and correct with patient at this time. My Chart     My chart gives you direct online access to portions of the electronic medical record (EMR) where your doctor stores your health information (ie, lab results, appointment information, medications, immunizations, and more. It is free. Would you like to set up your my chart? NO      Advance Care Planning    In the event something were to happen to you and you were unable to speak on your behalf, do you have an Advance Directive/ Living Will in place stating your wishes? NO    If yes, do we have a copy on file NO    If no, would you like information YES      ====Herlinda Mayfield Invitation====    Patient was invited to [facility] on this date and given the information folder for review. Recommended appointment with Herlinda Mayfield facilitator for ACP conversation regarding advance directives. [x] Yes  [] No  Referral sent to [facility] Choices team member or Coordinator for follow-up    [] Yes  [x] No  Patient scheduled an appointment. Site of Referral: [facility]      Today's provider has been notified of reason for visit, vitals and flowsheets obtained on patients.      Reviewed record in preparation for visit, huddled with provider and have obtained necessary documentation. Health Maintenance Due   Topic    DTaP/Tdap/Td series (1 - Tdap)    Shingrix Vaccine Age 50> (1 of 2)    FOBT Q 1 YEAR AGE 50-75        Wt Readings from Last 3 Encounters:   07/16/19 202 lb 11.2 oz (91.9 kg)   12/13/18 203 lb 12.8 oz (92.4 kg)   11/19/18 201 lb 12.8 oz (91.5 kg)     Temp Readings from Last 3 Encounters:   07/16/19 97.5 °F (36.4 °C) (Oral)   12/13/18 97.8 °F (36.6 °C) (Oral)   11/19/18 98 °F (36.7 °C) (Oral)     BP Readings from Last 3 Encounters:   07/16/19 117/72   12/13/18 141/74   11/19/18 145/83     Pulse Readings from Last 3 Encounters:   07/16/19 78   12/13/18 (!) 108   11/19/18 93     Vitals:    07/16/19 1145   BP: 117/72   Pulse: 78   Resp: 18   Temp: 97.5 °F (36.4 °C)   TempSrc: Oral   SpO2: 96%   Weight: 202 lb 11.2 oz (91.9 kg)   Height: 6' (1.829 m)   PainSc:   7   PainLoc: Head         Learning Assessment:  :     Learning Assessment 11/19/2018 4/26/2018   PRIMARY LEARNER Patient Patient   HIGHEST LEVEL OF EDUCATION - PRIMARY LEARNER  [employer] SOME COLLEGE   PRIMARY LANGUAGE ENGLISH ENGLISH   LEARNER PREFERENCE PRIMARY DEMONSTRATION READING   ANSWERED BY Patient self   RELATIONSHIP SELF SELF       Depression Screening:  :     3 most recent PHQ Screens 11/19/2018   Little interest or pleasure in doing things Not at all   Feeling down, depressed, irritable, or hopeless Not at all   Total Score PHQ 2 0       Fall Risk Assessment:  :     No flowsheet data found. Abuse Screening:  :     Abuse Screening Questionnaire 11/19/2018   Do you ever feel afraid of your partner? N   Are you in a relationship with someone who physically or mentally threatens you? N   Is it safe for you to go home?  Y       ADL Screening:  :     ADL Assessment 6/5/2018   Feeding yourself No Help Needed   Getting from bed to chair No Help Needed   Getting dressed No Help Needed   Bathing or showering No Help Needed   Walk across the room (includes cane/walker) No Help Needed   Using the telphone No Help Needed   Taking your medications No Help Needed   Preparing meals No Help Needed   Managing money (expenses/bills) No Help Needed   Moderately strenuous housework (laundry) No Help Needed   Shopping for personal items (toiletries/medicines) No Help Needed   Shopping for groceries No Help Needed   Driving No Help Needed   Climbing a flight of stairs No Help Needed   Getting to places beyond walking distances No Help Needed

## 2019-07-16 NOTE — PATIENT INSTRUCTIONS
Migraine Headache: Care Instructions Your Care Instructions Migraines are painful, throbbing headaches that often start on one side of the head. They may cause nausea and vomiting and make you sensitive to light, sound, or smell. Without treatment, migraines can last from 4 hours to a few days. Medicines can help prevent migraines or stop them after they have started. Your doctor can help you find which ones work best for you. Follow-up care is a key part of your treatment and safety. Be sure to make and go to all appointments, and call your doctor if you are having problems. It's also a good idea to know your test results and keep a list of the medicines you take. How can you care for yourself at home? · Do not drive if you have taken a prescription pain medicine. · Rest in a quiet, dark room until your headache is gone. Close your eyes, and try to relax or go to sleep. Don't watch TV or read. · Put a cold, moist cloth or cold pack on the painful area for 10 to 20 minutes at a time. Put a thin cloth between the cold pack and your skin. · Use a warm, moist towel or a heating pad set on low to relax tight shoulder and neck muscles. · Have someone gently massage your neck and shoulders. · Take your medicines exactly as prescribed. Call your doctor if you think you are having a problem with your medicine. You will get more details on the specific medicines your doctor prescribes. · Be careful not to take pain medicine more often than the instructions allow. You could get worse or more frequent headaches when the medicine wears off. To prevent migraines · Keep a headache diary so you can figure out what triggers your headaches. Avoiding triggers may help you prevent headaches. Record when each headache began, how long it lasted, and what the pain was like.  (Was it throbbing, aching, stabbing, or dull?) Write down any other symptoms you had with the headache, such as nausea, flashing lights or dark spots, or sensitivity to bright light or loud noise. Note if the headache occurred near your period. List anything that might have triggered the headache. Triggers may include certain foods (chocolate, cheese, wine) or odors, smoke, bright light, stress, or lack of sleep. · If your doctor has prescribed medicine for your migraines, take it as directed. You may have medicine that you take only when you get a migraine and medicine that you take all the time to help prevent migraines. ? If your doctor has prescribed medicine for when you get a headache, take it at the first sign of a migraine, unless your doctor has given you other instructions. ? If your doctor has prescribed medicine to prevent migraines, take it exactly as prescribed. Call your doctor if you think you are having a problem with your medicine. · Find healthy ways to deal with stress. Migraines are most common during or right after stressful times. Take time to relax before and after you do something that has caused a migraine in the past. 
· Try to keep your muscles relaxed by keeping good posture. Check your jaw, face, neck, and shoulder muscles for tension. Try to relax them. When you sit at a desk, change positions often. And make sure to stretch for 30 seconds each hour. · Get plenty of sleep and exercise. · Eat meals on a regular schedule. Avoid foods and drinks that often trigger migraines. These include chocolate, alcohol (especially red wine and port), aspartame, monosodium glutamate (MSG), and some additives found in foods (such as hot dogs, mon[place], cold cuts, aged cheeses, and pickled foods). · Limit caffeine. Don't drink too much coffee, tea, or soda. But don't quit caffeine suddenly. That can also give you migraines. · Do not smoke or allow others to smoke around you. If you need help quitting, talk to your doctor about stop-smoking programs and medicines. These can increase your chances of quitting for good. · If you are taking birth control pills or hormone therapy, talk to your doctor about whether they are triggering your migraines. When should you call for help? Call 911 anytime you think you may need emergency care. For example, call if: 
  · You have signs of a stroke. These may include: 
? Sudden numbness, paralysis, or weakness in your face, arm, or leg, especially on only one side of your body. ? Sudden vision changes. ? Sudden trouble speaking. ? Sudden confusion or trouble understanding simple statements. ? Sudden problems with walking or balance. ? A sudden, severe headache that is different from past headaches.  
 Call your doctor now or seek immediate medical care if: 
  · You have new or worse nausea and vomiting.  
  · You have a new or higher fever.  
  · Your headache gets much worse.  
 Watch closely for changes in your health, and be sure to contact your doctor if: 
  · You are not getting better after 2 days (48 hours). Where can you learn more? Go to http://eladia-meaghan.info/. Enter V857 in the search box to learn more about \"Migraine Headache: Care Instructions. \" Current as of: Selam 3, 2018 Content Version: 11.9 © 5552-2372 [employer]. Care instructions adapted under license by [employer] (which disclaims liability or warranty for this information). If you have questions about a medical condition or this instruction, always ask your healthcare professional. [place] [address] any warranty or liability for your use of this information. no

## 2019-07-16 NOTE — PROGRESS NOTES
Chief Complaint   Patient presents with    Migraine     Rm 7         HPI:  The patient is a 47 y.o. male who presents today for a follow up appointment. No hospital, ER or specialist visits since last primary care visit except as noted below. Migraine:  Has a hx of migraine HAs since he was 14years old. Had been using Fioricet without relief. Used to use Midrin with his old doctor and he feels that that worked better for him. Presents today with 7/10 migraine headache, intractable since this AM.  He has taken ASA only, ran out of Fioricet. He reports visual disturbance, motor functions decreased, pain will then settle in the back of his head. Denies N/V. Patient reports he has had toradol in the past without relief. Review of Systems  A comprehensive review of systems was negative except for that written in the HPI.     Patient Active Problem List   Diagnosis Code    Migraine without aura and without status migrainosus, not intractable G43.009    Restless leg syndrome G25.81       Past Medical History:   Diagnosis Date    Blood clot associated with vein wall inflammation 2014    Giant cell tumor     leg       Past Surgical History:   Procedure Laterality Date    HX ANKLE FRACTURE TX Left 1997    HX HERNIA REPAIR  2010    HX HERNIA REPAIR  2011    HX TONSILLECTOMY  1975    HX TUMOR REMOVAL Left 1990    ankle - Giant Cell tumor removed       Social History     Tobacco Use    Smoking status: Never Smoker    Smokeless tobacco: Never Used   Substance Use Topics    Alcohol use: No    Drug use: No       Family History   Problem Relation Age of Onset    Heart Disease Father     Stroke Father     Attention Deficit Disorder Sister     COPD Maternal Uncle     Alzheimer Paternal Aunt     Cancer Paternal Uncle     Diabetes Maternal Grandmother     Alcohol abuse Maternal Grandfather     Alcohol abuse Paternal Grandfather        Outpatient Medications Marked as Taking for the 7/16/19 encounter (Office Visit) with Julissa Robison NP   Medication Sig Dispense Refill    chvidnf-vjtdxosbq-alnpodxjdsvk (MIDRIN) -325 mg capsule Take 1 Cap by mouth four (4) times daily as needed for Migraine. Max Daily Amount: 4 Caps. 30 Cap 2    aspirin (ASPIRIN) 325 mg tablet Take 325 mg by mouth daily.  triamcinolone (NASACORT AQ) 55 mcg nasal inhaler 1 [place]. Current Outpatient Medications on File Prior to Visit   Medication Sig Dispense Refill    aspirin (ASPIRIN) 325 mg tablet Take 325 mg by mouth daily.  triamcinolone (NASACORT AQ) 55 mcg nasal inhaler 1 Spray.  clotrimazole (LOTRIMIN) 1 % topical cream Apply  to affected area two (2) times a day. 15 g 0    nystatin-triamcinolone (MYCOLOG II) topical cream Apply  to affected area two (2) times a day. 60 g 1     No current facility-administered medications on file prior to visit. Allergies   Allergen Reactions    Levofloxacin Shortness of Breath and Swelling    Mold Itching    Amitriptyline Rash       PE:  Visit Vitals  /72   Pulse 78   Temp 97.5 °F (36.4 °C) (Oral)   Resp 18   Ht 6' (1.829 m)   Wt 202 lb 11.2 oz (91.9 kg)   SpO2 96%   BMI 27.49 kg/m²       Gen: alert, oriented, no acute distress  Head: normocephalic, atraumatic  Ears: external auditory canals clear, TMs without erythema or effusion  Eyes: pupils equal round reactive to light, sclera clear, conjunctiva clear  Oral: moist mucus membranes, no oral lesions, no pharyngeal inflammation or exudate  Neck: symmetric normal sized thyroid, no carotid bruits, no jugular vein distention  Resp: no increase work of breathing, lungs clear to ausculation bilaterally, no wheezing, rales or rhonchi  CV: S1, S2 normal.  No murmurs, rubs, or gallops. Abd: soft, not tender, not distended. No hepatosplenomegaly. Normal bowel sounds. No hernias. No abdominal or renal bruits.   Neuro: cranial nerves intact, normal strength and movement in all extremities, reflexes and sensation intact and symmetric. Skin: no lesion or rash  Extremities: no cyanosis or edema    No results found for this visit on 07/16/19. Assessment/Plan:    ICD-10-CM ICD-9-CM    1. Migraine without aura and without status migrainosus, not intractable G43.009 346.10 unsqqjz-kngrfzdvy-mvdkryqlsmig (MIDRIN) -325 mg capsule   2. Intractable episodic tension-type headache G44.211 339.11 vbjcymq-xocxsnpqn-wlfhdharjkcd (MIDRIN) -325 mg capsule     Diagnoses and all orders for this visit:    1. Migraine without aura and without status migrainosus, not intractable  -     sfdjudk-uulsmtdlu-xuqqbolxdmha (MIDRIN) -325 mg capsule; Take 1 Cap by mouth four (4) times daily as needed for Migraine. Max Daily Amount: 4 Caps. 2. Intractable episodic tension-type headache  -     wdpbqgg-bloubvalw-szjrvywcsgzo (MIDRIN) -325 mg capsule; Take 1 Cap by mouth four (4) times daily as needed for Migraine. Max Daily Amount: 4 Caps. Follow-up and Dispositions    · Return if symptoms worsen or fail to improve. reviewed diet, exercise and weight control  reviewed medications and side effects in detail    call if any problems    Health Maintenance reviewed - deferred to PCP. Recommended healthy diet low in carbohydrates, fats, sodium and cholesterol. Recommended regular cardiovascular exercise 3-6 times per week for 30-60 minutes daily. Chart is reviewed and updated today in the office. Records requested for other providers patient has seen and is currently seeing. Patient was offered a choice/choices in the treatment plan today. Patient expresses understanding of the plan and agrees with recommendations. Verbal and written instructions (see AVS) provided. See patient instructions for more. Patient expresses understanding of diagnosis and treatment plan.

## 2020-07-08 ENCOUNTER — HOSPITAL ENCOUNTER (OUTPATIENT)
Dept: CT IMAGING | Age: 55
Discharge: HOME | End: 2020-07-08
Attending: SPECIALIST
Payer: COMMERCIAL

## 2020-07-08 DIAGNOSIS — K22.89 ESOPHAGEAL MASS: ICD-10-CM

## 2020-07-08 DIAGNOSIS — R13.10 DYSPHAGIA: ICD-10-CM

## 2020-07-08 PROCEDURE — 74011636320 HC RX REV CODE- 636/320: Performed by: SPECIALIST

## 2020-07-08 PROCEDURE — 71260 CT THORAX DX C+: CPT

## 2020-07-08 RX ORDER — SODIUM CHLORIDE 0.9 % (FLUSH) 0.9 %
10 SYRINGE (ML) INJECTION
Status: COMPLETED | OUTPATIENT
Start: 2020-07-08 | End: 2020-07-08

## 2020-07-08 RX ADMIN — Medication 10 ML: at 08:37

## 2020-07-08 RX ADMIN — IOPAMIDOL 80 ML: 755 INJECTION, SOLUTION INTRAVENOUS at 08:37

## 2020-07-28 ENCOUNTER — HOSPITAL ENCOUNTER (OUTPATIENT)
Dept: MRI IMAGING | Age: 55
Discharge: HOME | End: 2020-07-28
Attending: FAMILY MEDICINE
Payer: COMMERCIAL

## 2020-07-28 DIAGNOSIS — M67.911 TENDINOPATHY OF ROTATOR CUFF, RIGHT: ICD-10-CM

## 2020-07-28 DIAGNOSIS — M75.41 ROTATOR CUFF IMPINGEMENT SYNDROME, RIGHT: ICD-10-CM

## 2020-07-28 PROCEDURE — 73221 MRI JOINT UPR EXTREM W/O DYE: CPT

## 2021-01-26 ENCOUNTER — TRANSCRIBE ORDER (OUTPATIENT)
Dept: SCHEDULING | Age: 56
End: 2021-01-26

## 2021-01-26 DIAGNOSIS — R41.3 MEMORY LOSS: Primary | ICD-10-CM

## 2022-03-18 PROBLEM — G43.009 MIGRAINE WITHOUT AURA AND WITHOUT STATUS MIGRAINOSUS, NOT INTRACTABLE: Status: ACTIVE | Noted: 2018-05-14

## 2022-03-18 PROBLEM — G25.81 RESTLESS LEG SYNDROME: Status: ACTIVE | Noted: 2018-05-14

## 2022-06-15 ENCOUNTER — TRANSCRIBE ORDER (OUTPATIENT)
Dept: SCHEDULING | Age: 57
End: 2022-06-15

## 2022-06-15 DIAGNOSIS — S06.5XAS LATE EFFECT OF SUBDURAL HEMATOMA DUE TO TRAUMA: Primary | ICD-10-CM

## 2022-06-23 ENCOUNTER — HOSPITAL ENCOUNTER (OUTPATIENT)
Dept: CT IMAGING | Age: 57
Discharge: HOME | End: 2022-06-23
Attending: PHYSICIAN ASSISTANT
Payer: MEDICAID

## 2022-06-23 DIAGNOSIS — S06.5XAS LATE EFFECT OF SUBDURAL HEMATOMA DUE TO TRAUMA: ICD-10-CM

## 2022-06-23 PROCEDURE — 70450 CT HEAD/BRAIN W/O DYE: CPT

## 2022-07-06 ENCOUNTER — OFFICE VISIT (OUTPATIENT)
Dept: NEUROLOGY | Age: 57
End: 2022-07-06
Payer: MEDICAID

## 2022-07-06 ENCOUNTER — TELEPHONE (OUTPATIENT)
Dept: NEUROLOGY | Age: 57
End: 2022-07-06

## 2022-07-06 VITALS
OXYGEN SATURATION: 98 % | SYSTOLIC BLOOD PRESSURE: 116 MMHG | TEMPERATURE: 97.4 F | WEIGHT: 207.2 LBS | RESPIRATION RATE: 18 BRPM | HEIGHT: 72 IN | DIASTOLIC BLOOD PRESSURE: 68 MMHG | BODY MASS INDEX: 28.06 KG/M2 | HEART RATE: 72 BPM

## 2022-07-06 DIAGNOSIS — I67.89 CEREBRAL MICROVASCULAR DISEASE: ICD-10-CM

## 2022-07-06 DIAGNOSIS — F07.81 POST CONCUSSION SYNDROME: ICD-10-CM

## 2022-07-06 DIAGNOSIS — R41.82 ACUTE ALTERATION IN MENTAL STATUS: ICD-10-CM

## 2022-07-06 DIAGNOSIS — R55 MICTURITION SYNCOPE: ICD-10-CM

## 2022-07-06 DIAGNOSIS — I65.23 BILATERAL CAROTID ARTERY STENOSIS: ICD-10-CM

## 2022-07-06 DIAGNOSIS — R55 CONVULSIVE SYNCOPE: ICD-10-CM

## 2022-07-06 DIAGNOSIS — R42 POST-CONCUSSION VERTIGO: Primary | ICD-10-CM

## 2022-07-06 DIAGNOSIS — D68.9 CLOTTING DISORDER (HCC): ICD-10-CM

## 2022-07-06 DIAGNOSIS — F07.81 POST-CONCUSSION VERTIGO: Primary | ICD-10-CM

## 2022-07-06 PROCEDURE — 99205 OFFICE O/P NEW HI 60 MIN: CPT | Performed by: PSYCHIATRY & NEUROLOGY

## 2022-07-06 RX ORDER — ST. JOHN'S WORT 300 MG
CAPSULE ORAL
COMMUNITY

## 2022-07-06 RX ORDER — BUTALBITAL, ACETAMINOPHEN AND CAFFEINE 50; 325; 40 MG/1; MG/1; MG/1
1 TABLET ORAL
COMMUNITY
Start: 2021-11-01

## 2022-07-06 RX ORDER — EPINEPHRINE 0.3 MG/.3ML
0.3 INJECTION SUBCUTANEOUS
COMMUNITY
End: 2022-07-06 | Stop reason: ALTCHOICE

## 2022-07-06 RX ORDER — ATORVASTATIN CALCIUM 20 MG/1
20 TABLET, FILM COATED ORAL DAILY
COMMUNITY
Start: 2022-06-24 | End: 2022-10-06 | Stop reason: ALTCHOICE

## 2022-07-06 RX ORDER — SUMATRIPTAN 100 MG/1
TABLET, FILM COATED ORAL
COMMUNITY
Start: 2021-11-01

## 2022-07-06 RX ORDER — RIVAROXABAN 20 MG/1
TABLET, FILM COATED ORAL
COMMUNITY
Start: 2022-06-23

## 2022-07-06 RX ORDER — VALACYCLOVIR HYDROCHLORIDE 1 G/1
TABLET, FILM COATED ORAL
COMMUNITY
Start: 2022-06-24

## 2022-07-06 RX ORDER — LANOLIN ALCOHOL/MO/W.PET/CERES
400 CREAM (GRAM) TOPICAL DAILY
COMMUNITY
End: 2022-10-06 | Stop reason: ALTCHOICE

## 2022-07-06 RX ORDER — TAMSULOSIN HYDROCHLORIDE 0.4 MG/1
1 CAPSULE ORAL
COMMUNITY
Start: 2021-11-01

## 2022-07-06 RX ORDER — CHOLECALCIFEROL (VITAMIN D3) 125 MCG
300 CAPSULE ORAL DAILY
COMMUNITY
End: 2022-10-06 | Stop reason: ALTCHOICE

## 2022-07-06 NOTE — LETTER
7/6/2022 12:44 PM    Patient:  Lulu Duron   YOB: 1965  Date of Visit: 7/6/2022      Dear   No Recipients: Thank you for referring Mr. Lulu Duron to me for evaluation/treatment. Below are the relevant portions of my assessment and plan of care. If you have questions, please do not hesitate to call me. I look forward to following Mr. Kemar Garcia along with you. Consult  REFERRED BY:  TIMBO Estrada    CHIEF COMPLAINT: History of fall and passing out and head injury and traumatic subdural hematoma with persistent posttraumatic vertigo      Subjective:     Lulu Duron is a 55 y.o. 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 cut off and he went home. He was supposed to follow-up with neurosurgery but never did, and does not follow-up with any neuro specialists so far.   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 He never required surgical intervention, but did require Xarelto as anticoagulation. He had a recent repeat CT scan of the head as follow-up June 23, 2022 that just showed prominent size ventricles likely central atrophy but no other structural lesion or evidence of hematoma or bleed. He was told he probably had a stroke, but then his other doctor told him he did not, and he is a little concerned. 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 He has no focal weakness or sensory loss and has no other neurologic deficit other than mainly that positional vertigo when he gets up or moves quickly or turns his head, and this has persisted though it is somewhat better, so we will send him to vestibular rehab for that. Because of his episode of micturition syncope, we will need to get an ambulatory 24-hour EEG to rule out other causes for seizures as a cause of his passing out and today carotid Doppler study and imaging of the brain to rule out structural causes of his passing out episode he was referred to cardiology also for evaluation to make sure there is no other cardiac source. Because of his clots and previous history of clots in his legs, he was sent to hematology to be evaluated to rule out clotting disorder in addition. He did not think he saw a hematologist and does not think he ever had an MRI of the brain done we need 1 of those to evaluate because of all of his neurologic symptoms. 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 Past Medical History:   Diagnosis Date    Blood clot associated with vein wall inflammation 2014    Cerebral artery occlusion with cerebral infarction (HCC)     Giant cell tumor     leg    Headache     Migraine     Syncope and collapse       Past Surgical History:   Procedure Laterality Date    HX ANKLE FRACTURE TX Left 1997    HX HERNIA REPAIR  2010    HX HERNIA REPAIR  2011    HX TONSILLECTOMY  1975    HX TUMOR REMOVAL Left 1990    ankle - Giant Cell tumor removed     Family History   Problem Relation Age of Onset    Heart Disease Father     Stroke Father     Attention Deficit Disorder Sister     COPD Maternal Uncle     Alzheimer's Disease Paternal Aunt     Cancer Paternal Uncle     Diabetes Maternal Grandmother     Alcohol abuse Maternal Grandfather     Alcohol abuse Paternal Grandfather       Social History     Tobacco Use    Smoking status: Never Smoker    Smokeless tobacco: Never Used   Substance Use Topics    Alcohol use: No         Current Outpatient Medications:     atorvastatin (LIPITOR) 20 mg tablet, Take 20 mg by mouth daily. , Disp: , Rfl:     butalbital-acetaminophen-caffeine (Esgic) -40 mg per tablet, Take 1 Tablet by mouth., Disp: , Rfl:     Xarelto 20 mg tab tablet, TAKE 1 TABLET BY MOUTH EVERY DAY WITH THE EVENING MEAL, Disp: , Rfl:     cholo's wort 300 mg cap, , Disp: , Rfl:     SUMAtriptan (Imitrex) 100 mg tablet, take 1 tablet by oral route once with fluids as early as possible after the onset of a migraine - may repeat after 2 hr- max 200mg/day, Disp: , Rfl:     tamsulosin (Flomax) 0.4 mg capsule, Take 1 Capsule by mouth., Disp: , Rfl:     valACYclovir (VALTREX) 1 gram tablet, TAKE 1 TABLET BY MOUTH EVERY DAY AT ONSET OF SYMPTOMS FOR 5 DAYS, Disp: , Rfl:     magnesium oxide (MAG-OX) 400 mg tablet, Take 400 mg by mouth daily. , Disp: , Rfl:     co-enzyme Q-10 (Co Q-10) 100 mg capsule, Take 300 mg by mouth daily. , Disp: , Rfl:     aspirin (ASPIRIN) 325 mg tablet, Take 325 mg by mouth daily. , Disp: , Rfl:     triamcinolone (NASACORT AQ) 55 mcg nasal inhaler, 1 Spray., Disp: , Rfl:         Allergies   Allergen Reactions    Levofloxacin Shortness of Breath and Swelling    Mold Itching    Sulfabenzamide Other (comments)    Amitriptyline Rash      MRI Results (most recent):  Results from Hospital Encounter encounter on 07/28/20    MRI SHOULDER RT WO CONT    Narrative  EXAM: MRI SHOULDER RT WO CONT    INDICATION: Right shoulder pain and impingement. COMPARISON: None    TECHNIQUE: Axial proton density fat-saturated; oblique coronal T1, T2  fat-saturated, and proton density fat-saturated; and oblique sagittal T2  fat-saturated MRI of the right shoulder . CONTRAST: None. FINDINGS: A.C. joint: Within normal limits. Anterior acromion process type: 2    Bone marrow: Within normal limits. No acute fracture, dislocation, or marrow  replacing process. Joint fluid: Physiologic. Rotator cuff tendons: High-grade partial-thickness bursal surface tear of the  distal, anterior supraspinatus tendon. Bursal surface fraying of the  supraspinatus and infraspinatus tendons. Teres minor and subscapularis tendons  are intact. Biceps tendon: Intact and located within the bicipital groove. Muscles: No edema or atrophy. Glenoid labrum: Intact. Glenohumeral joint capsule: within normal limits. Glenohumeral articular cartilage: Intact. No focal osteochondral lesion. Soft tissue mass: None. Trace fluid in the subacromial/subdeltoid bursa. Impression  IMPRESSION:  1. High-grade partial-thickness bursal surface tear of the supraspinatus tendon  at its insertion. No visible full thickness rotator cuff tendon tear. 2. No evidence of labral tear. 3. Joints are within normal limits. 4. Minimal subacromial/subdeltoid bursitis.       Results from Hospital Encounter encounter on 07/28/20    MRI SHOULDER RT WO CONT    Narrative  EXAM: MRI SHOULDER RT WO CONT    INDICATION: Right shoulder pain and impingement. COMPARISON: None    TECHNIQUE: Axial proton density fat-saturated; oblique coronal T1, T2  fat-saturated, and proton density fat-saturated; and oblique sagittal T2  fat-saturated MRI of the right shoulder . CONTRAST: None. FINDINGS: A.C. joint: Within normal limits. Anterior acromion process type: 2    Bone marrow: Within normal limits. No acute fracture, dislocation, or marrow  replacing process. Joint fluid: Physiologic. Rotator cuff tendons: High-grade partial-thickness bursal surface tear of the  distal, anterior supraspinatus tendon. Bursal surface fraying of the  supraspinatus and infraspinatus tendons. Teres minor and subscapularis tendons  are intact. Biceps tendon: Intact and located within the bicipital groove. Muscles: No edema or atrophy. Glenoid labrum: Intact. Glenohumeral joint capsule: within normal limits. Glenohumeral articular cartilage: Intact. No focal osteochondral lesion. Soft tissue mass: None. Trace fluid in the subacromial/subdeltoid bursa. Impression  IMPRESSION:  1. High-grade partial-thickness bursal surface tear of the supraspinatus tendon  at its insertion. No visible full thickness rotator cuff tendon tear. 2. No evidence of labral tear. 3. Joints are within normal limits. 4. Minimal subacromial/subdeltoid bursitis.     Review of Systems:  A comprehensive review of systems was negative except for: Constitutional: positive for fatigue and malaise  Ears, nose, mouth, throat, and face: positive for Postconcussive vertigo  Cardiovascular: positive for near-syncope, syncope, dizziness  Musculoskeletal: positive for myalgias, arthralgias and stiff joints  Neurological: positive for headaches, dizziness, vertigo, memory problems and coordination problems  Behvioral/Psych: positive for anxiety and depression   Vitals:    07/06/22 0744   BP: 116/68   Pulse: 72   Resp: 18   Temp: 97.4 °F (36.3 °C)   SpO2: 98%   Weight: 207 lb 3.2 oz (94 kg)   Height: 6' (1.829 m)     Objective:     I      NEUROLOGICAL EXAM:    Appearance: The patient is well developed, well nourished, provides a coherent history and is in no acute distress. Mental Status: Oriented to time, place and person, and the president, cognitive function is normal and speech is fluent and no aphasia or dysarthria. Mood and affect appropriate. Cranial Nerves:   Intact visual fields. Fundi are benign, disc are flat, no lesions seen on funduscopy. SUDHEER, EOM's full, no nystagmus, no ptosis. Facial sensation is normal. Corneal reflexes are not tested. Facial movement is symmetric. Hearing is normal bilaterally. Palate is midline with normal sternocleidomastoid and trapezius muscles are normal. Tongue is midline. Neck without meningismus or bruits  Temporal arteries are not tender or enlarged  TMJ areas are not tender on palpation   Motor:  5/5 strength in upper and lower proximal and distal muscles. Normal bulk and tone. No fasciculations. Rapid alternating movement is symmetric and intact bilaterally   Reflexes:   Deep tendon reflexes 2+/4 and symmetrical.  No babinski or clonus present  Patient developed positional vertigo on Hallpike Skamokawa maneuver. Sensory:   Normal to touch, pinprick and vibration and temperature. DSS is intact   Gait:  Normal gait for patient's age. Tremor:   No tremor noted. Cerebellar:   Mildly abnormal cerebellar signs present on Romberg and tandem testing and finger-nose-finger exam.   Neurovascular:  Normal heart sounds and regular rhythm, peripheral pulses intact, and no carotid bruits.            Assessment:       ICD-10-CM ICD-9-CM    1. Post-concussion vertigo  F07.81 310.2 NEURO EEG 24 HR    R42 780.4 DUPLEX CAROTID BILATERAL      MRI BRAIN W WO CONT      REFERRAL TO PHYSICAL THERAPY      REFERRAL TO HEMATOLOGY ONCOLOGY      REFERRAL TO CARDIOLOGY   2. Post concussion syndrome  F07.81 310.2 NEURO EEG 24 HR      DUPLEX CAROTID BILATERAL      MRI BRAIN W WO CONT      REFERRAL TO PHYSICAL THERAPY      REFERRAL TO HEMATOLOGY ONCOLOGY      REFERRAL TO CARDIOLOGY   3. Clotting disorder (HCC)  D68.9 286.9 NEURO EEG 24 HR      DUPLEX CAROTID BILATERAL      MRI BRAIN W WO CONT      REFERRAL TO PHYSICAL THERAPY      REFERRAL TO HEMATOLOGY ONCOLOGY      REFERRAL TO CARDIOLOGY   4. Convulsive syncope  R55 780.2 NEURO EEG 24 HR     780.39 DUPLEX CAROTID BILATERAL      MRI BRAIN W WO CONT      REFERRAL TO PHYSICAL THERAPY      REFERRAL TO HEMATOLOGY ONCOLOGY      REFERRAL TO CARDIOLOGY   5. Micturition syncope  R55 780.2 NEURO EEG 24 HR      DUPLEX CAROTID BILATERAL      MRI BRAIN W WO CONT      REFERRAL TO PHYSICAL THERAPY      REFERRAL TO HEMATOLOGY ONCOLOGY      REFERRAL TO CARDIOLOGY   6. Acute alteration in mental status  R41.82 780.97 NEURO EEG 24 HR      DUPLEX CAROTID BILATERAL      MRI BRAIN W WO CONT      REFERRAL TO PHYSICAL THERAPY      REFERRAL TO HEMATOLOGY ONCOLOGY      REFERRAL TO CARDIOLOGY   7. Bilateral carotid artery stenosis  I65.23 433.10 NEURO EEG 24 HR     433.30 DUPLEX CAROTID BILATERAL      MRI BRAIN W WO CONT      REFERRAL TO PHYSICAL THERAPY      REFERRAL TO HEMATOLOGY ONCOLOGY      REFERRAL TO CARDIOLOGY   8. Cerebral microvascular disease  I67.89 437.8 NEURO EEG 24 HR      DUPLEX CAROTID BILATERAL      MRI BRAIN W WO CONT      REFERRAL TO PHYSICAL THERAPY      REFERRAL TO HEMATOLOGY ONCOLOGY      REFERRAL TO CARDIOLOGY     Active Problems:    * No active hospital problems. *      Plan:     Patient with complicated problem of micturition syncope possibly in loss of consciousness associated with traumatic subdural hematomas and head injury and postconcussive syndrome and postconcussive vertigo.   Because of his symptoms, we will order an MRI of the brain to make sure that there are no gross as diagnosed by one of his doctors, make sure there is no complication from his bleeding on his anticoagulation. He is referred to vestibular therapy for the postconcussive vertigo, and was advised not to drive for 6 months after an unexplained loss of consciousness. Because of the syncopal episode, we will obtain a 24-hour ambulatory EEG and a carotid Doppler exam in addition to the MRIs and MRAs of the brain, to make sure that there are no other causes of his syncope. He will be referred to hematology for hypercoagulable work-up because of his generalized history of venous thrombosis and hypercoagulable syndrome  We advised the patient that the single most important thing is not to hit his head again. Repeated head injuries, causes traumatic encephalopathies. Because of his syncope he was referred to cardiology for further evaluation to rule out cardiac cause advised not to drive for 6-month until we make sure he is symptom-free. He will call if there is any further problem, he will continue to gradually try to increase his activities and get back into a more normal level of functioning as tolerated with precautions because of his anticoagulation because of his clotting syndrome. Further treatment evaluation will depend results of his clinical course and response to therapy and other needs, he will call us immediately if any problem or questions about his condition or tests  62 minutes spent with the patient going over all this in detail, he is obvious he is at a high risk for further complications because of his clotting disorder and syncopal episode and further neurologic injury and dysfunction if he should seen again in treatable causes of his symptoms are not found.   All of his records from the [facility] rehab center that he had were reviewed, and his CT scans reviewed all personally on the disc he brought with him, and I agree with all reports, and diagnoses as dictated, and because of all of this his treatment and evaluation and further testing is as dictated above. Signed By: Suzy Rosado MD     July 6, 2022       CC:  Doc Leblanc  FAX: [phone]          Sincerely,      Suzy Rosado MD

## 2022-07-06 NOTE — TELEPHONE ENCOUNTER
Faxed face sheet, ins cards, referral to Dr. Princess Rivas, ph # [phone], fax # [phone]. Confirmation received. Faxed to Dr. Michelle Pettit, ph # [phone], fax # [phone]. Tito Pruitt Confirmation received.

## 2022-07-06 NOTE — PROGRESS NOTES
Consult  REFERRED BY:  TIMBO Donahue    CHIEF COMPLAINT: History of fall and passing out and head injury and traumatic subdural hematoma with persistent posttraumatic vertigo      Subjective:     Priscilla Allen is a 55 y.o. 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 cut off and he went home. He was supposed to follow-up with neurosurgery but never did, and does not follow-up with any neuro specialists so far. 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 He never required surgical intervention, but did require Xarelto as anticoagulation. He had a recent repeat CT scan of the head as follow-up June 23, 2022 that just showed prominent size ventricles likely central atrophy but no other structural lesion or evidence of hematoma or bleed.   He was told he probably had a stroke, but then his other doctor told him he did not, and he is a little concerned. 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 He has no focal weakness or sensory loss and has no other neurologic deficit other than mainly that positional vertigo when he gets up or moves quickly or turns his head, and this has persisted though it is somewhat better, so we will send him to vestibular rehab for that. Because of his episode of micturition syncope, we will need to get an ambulatory 24-hour EEG to rule out other causes for seizures as a cause of his passing out and today carotid Doppler study and imaging of the brain to rule out structural causes of his passing out episode he was referred to cardiology also for evaluation to make sure there is no other cardiac source. Because of his clots and previous history of clots in his legs, he was sent to hematology to be evaluated to rule out clotting disorder in addition. He did not think he saw a hematologist and does not think he ever had an MRI of the brain done we need 1 of those to evaluate because of all of his neurologic symptoms. 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       Past Medical History:   Diagnosis Date    Blood clot associated with vein wall inflammation 2014    Cerebral artery occlusion with cerebral infarction (HCC)     Giant cell tumor     leg    Headache     Migraine     Syncope and collapse       Past Surgical History: Procedure Laterality Date    HX ANKLE FRACTURE [address] Left 1997    HX HERNIA REPAIR  2010    HX HERNIA REPAIR  2011    HX TONSILLECTOMY  1975    HX TUMOR REMOVAL Left 1990    ankle - Giant Cell tumor removed     Family History   Problem Relation Age of Onset    Heart Disease Father     Stroke Father     Attention Deficit Disorder Sister     COPD Maternal Uncle     Alzheimer's Disease Paternal Aunt     Cancer Paternal Uncle     Diabetes Maternal Grandmother     Alcohol abuse Maternal Grandfather     Alcohol abuse Paternal Grandfather       Social History     Tobacco Use    Smoking status: Never Smoker    Smokeless tobacco: Never Used   Substance Use Topics    Alcohol use: No         Current Outpatient Medications:     atorvastatin (LIPITOR) 20 mg tablet, Take 20 mg by mouth daily. , Disp: , Rfl:     butalbital-acetaminophen-caffeine (Esgic) -40 mg per tablet, Take 1 Tablet by mouth., Disp: , Rfl:     Xarelto 20 mg tab tablet, TAKE 1 TABLET BY MOUTH EVERY DAY WITH THE EVENING MEAL, Disp: , Rfl:     cholo's wort 300 mg cap, , Disp: , Rfl:     SUMAtriptan (Imitrex) 100 mg tablet, take 1 tablet by oral route once with fluids as early as possible after the onset of a migraine - may repeat after 2 hr- max 200mg/day, Disp: , Rfl:     tamsulosin (Flomax) 0.4 mg capsule, Take 1 Capsule by mouth., Disp: , Rfl:     valACYclovir (VALTREX) 1 gram tablet, TAKE 1 TABLET BY MOUTH EVERY DAY AT ONSET OF SYMPTOMS FOR 5 DAYS, Disp: , Rfl:     magnesium oxide (MAG-OX) 400 mg tablet, Take 400 mg by mouth daily. , Disp: , Rfl:     co-enzyme Q-10 (Co Q-10) 100 mg capsule, Take 300 mg by mouth daily. , Disp: , Rfl:     aspirin (ASPIRIN) 325 mg tablet, Take 325 mg by mouth daily. , Disp: , Rfl:     triamcinolone (NASACORT AQ) 55 mcg nasal inhaler, 1 Spray., Disp: , Rfl:         Allergies   Allergen Reactions    Levofloxacin Shortness of Breath and Swelling    Mold Itching    Sulfabenzamide Other (comments)  Amitriptyline Rash      MRI Results (most recent):  Results from Hospital Encounter encounter on 07/28/20    MRI SHOULDER RT WO CONT    Narrative  EXAM: MRI SHOULDER RT WO CONT    INDICATION: Right shoulder pain and impingement. COMPARISON: None    TECHNIQUE: Axial proton density fat-saturated; oblique coronal T1, T2  fat-saturated, and proton density fat-saturated; and oblique sagittal T2  fat-saturated MRI of the right shoulder . CONTRAST: None. FINDINGS: A.C. joint: Within normal limits. Anterior acromion process type: 2    Bone marrow: Within normal limits. No acute fracture, dislocation, or marrow  replacing process. Joint fluid: Physiologic. Rotator cuff tendons: High-grade partial-thickness bursal surface tear of the  distal, anterior supraspinatus tendon. Bursal surface fraying of the  supraspinatus and infraspinatus tendons. Teres minor and subscapularis tendons  are intact. Biceps tendon: Intact and located within the bicipital groove. Muscles: No edema or atrophy. Glenoid labrum: Intact. Glenohumeral joint capsule: within normal limits. Glenohumeral articular cartilage: Intact. No focal osteochondral lesion. Soft tissue mass: None. Trace fluid in the subacromial/subdeltoid bursa. Impression  IMPRESSION:  1. High-grade partial-thickness bursal surface tear of the supraspinatus tendon  at its insertion. No visible full thickness rotator cuff tendon tear. 2. No evidence of labral tear. 3. Joints are within normal limits. 4. Minimal subacromial/subdeltoid bursitis. Results from [place] encounter on 07/28/20    MRI SHOULDER RT WO CONT    Narrative  EXAM: MRI SHOULDER RT WO CONT    INDICATION: Right shoulder pain and impingement. COMPARISON: None    TECHNIQUE: Axial proton density fat-saturated; oblique coronal T1, T2  fat-saturated, and proton density fat-saturated; and oblique sagittal T2  fat-saturated MRI of the right shoulder .     CONTRAST: None.    FINDINGS: A.C. joint: Within normal limits. Anterior acromion process type: 2    Bone marrow: Within normal limits. No acute fracture, dislocation, or marrow  replacing process. Joint fluid: Physiologic. Rotator cuff tendons: High-grade partial-thickness bursal surface tear of the  distal, anterior supraspinatus tendon. Bursal surface fraying of the  supraspinatus and infraspinatus tendons. Teres minor and subscapularis tendons  are intact. Biceps tendon: Intact and located within the bicipital groove. Muscles: No edema or atrophy. Glenoid labrum: Intact. Glenohumeral joint capsule: within normal limits. Glenohumeral articular cartilage: Intact. No focal osteochondral lesion. Soft tissue mass: None. Trace fluid in the subacromial/subdeltoid bursa. Impression  IMPRESSION:  1. High-grade partial-thickness bursal surface tear of the supraspinatus tendon  at its insertion. No visible full thickness rotator cuff tendon tear. 2. No evidence of labral tear. 3. Joints are within normal limits. 4. Minimal subacromial/subdeltoid bursitis. Review of Systems:  A comprehensive review of systems was negative except for: Constitutional: positive for fatigue and malaise  Ears, nose, mouth, throat, and face: positive for Postconcussive vertigo  Cardiovascular: positive for near-syncope, syncope, dizziness  Musculoskeletal: positive for myalgias, arthralgias and stiff joints  Neurological: positive for headaches, dizziness, vertigo, memory problems and coordination problems  Behvioral/Psych: positive for anxiety and depression   Vitals:    07/06/22 0744   BP: 116/68   Pulse: 72   Resp: 18   Temp: 97.4 °F (36.3 °C)   SpO2: 98%   Weight: 207 lb 3.2 oz (94 kg)   Height: 6' (1.829 m)     Objective:     I      NEUROLOGICAL EXAM:    Appearance: The patient is well developed, well nourished, provides a coherent history and is in no acute distress.    Mental Status: Oriented to time, place and person, and the president, cognitive function is normal and speech is fluent and no aphasia or dysarthria. Mood and affect appropriate. Cranial Nerves:   Intact visual fields. Fundi are benign, disc are flat, no lesions seen on funduscopy. SUDHEER, EOM's full, no nystagmus, no ptosis. Facial sensation is normal. Corneal reflexes are not tested. Facial movement is symmetric. Hearing is normal bilaterally. Palate is midline with normal sternocleidomastoid and trapezius muscles are normal. Tongue is midline. Neck without meningismus or bruits  Temporal arteries are not tender or enlarged  TMJ areas are not tender on palpation   Motor:  5/5 strength in upper and lower proximal and distal muscles. Normal bulk and tone. No fasciculations. Rapid alternating movement is symmetric and intact bilaterally   Reflexes:   Deep tendon reflexes 2+/4 and symmetrical.  No babinski or clonus present  Patient developed positional vertigo on Hallpike Josey maneuver. Sensory:   Normal to touch, pinprick and vibration and temperature. DSS is intact   Gait:  Normal gait for patient's age. Tremor:   No tremor noted. Cerebellar:   Mildly abnormal cerebellar signs present on Romberg and tandem testing and finger-nose-finger exam.   Neurovascular:  Normal heart sounds and regular rhythm, peripheral pulses intact, and no carotid bruits. Assessment:       ICD-10-CM ICD-9-CM    1. Post-concussion vertigo  F07.81 310.2 NEURO EEG 24 HR    R42 780.4 DUPLEX CAROTID BILATERAL      MRI BRAIN W WO CONT      REFERRAL TO PHYSICAL THERAPY      REFERRAL TO HEMATOLOGY ONCOLOGY      REFERRAL TO CARDIOLOGY   2.  Post concussion syndrome  F07.81 310.2 NEURO EEG 24 HR      DUPLEX CAROTID BILATERAL      MRI BRAIN W WO CONT      REFERRAL TO PHYSICAL THERAPY      REFERRAL TO HEMATOLOGY ONCOLOGY      REFERRAL TO CARDIOLOGY   3. Clotting disorder (HCC)  D68.9 286.9 NEURO EEG 24 HR      DUPLEX CAROTID BILATERAL      MRI BRAIN W WO CONT      REFERRAL TO PHYSICAL THERAPY      REFERRAL TO HEMATOLOGY ONCOLOGY      REFERRAL TO CARDIOLOGY   4. Convulsive syncope  R55 780.2 NEURO EEG 24 HR     780.39 DUPLEX CAROTID BILATERAL      MRI BRAIN W WO CONT      REFERRAL TO PHYSICAL THERAPY      REFERRAL TO HEMATOLOGY ONCOLOGY      REFERRAL TO CARDIOLOGY   5. Micturition syncope  R55 780.2 NEURO EEG 24 HR      DUPLEX CAROTID BILATERAL      MRI BRAIN W WO CONT      REFERRAL TO PHYSICAL THERAPY      REFERRAL TO HEMATOLOGY ONCOLOGY      REFERRAL TO CARDIOLOGY   6. Acute alteration in mental status  R41.82 780.97 NEURO EEG 24 HR      DUPLEX CAROTID BILATERAL      MRI BRAIN W WO CONT      REFERRAL TO PHYSICAL THERAPY      REFERRAL TO HEMATOLOGY ONCOLOGY      REFERRAL TO CARDIOLOGY   7. Bilateral carotid artery stenosis  I65.23 433.10 NEURO EEG 24 HR     433.30 DUPLEX CAROTID BILATERAL      MRI BRAIN W WO CONT      REFERRAL TO PHYSICAL THERAPY      REFERRAL TO HEMATOLOGY ONCOLOGY      REFERRAL TO CARDIOLOGY   8. Cerebral microvascular disease  I67.89 437.8 NEURO EEG 24 HR      DUPLEX CAROTID BILATERAL      MRI BRAIN W WO CONT      REFERRAL TO PHYSICAL THERAPY      REFERRAL TO HEMATOLOGY ONCOLOGY      REFERRAL TO CARDIOLOGY     Active Problems:    * No active hospital problems. *      Plan:     Patient with complicated problem of micturition syncope possibly in loss of consciousness associated with traumatic subdural hematomas and head injury and postconcussive syndrome and postconcussive vertigo. Because of his symptoms, we will order an MRI of the brain to make sure that there are no gross as diagnosed by one of his doctors, make sure there is no complication from his bleeding on his anticoagulation. He is referred to vestibular therapy for the postconcussive vertigo, and was advised not to drive for 6 months after an unexplained loss of consciousness.   Because of the syncopal episode, we will obtain a 24-hour ambulatory EEG and a carotid Doppler exam in addition to the MRIs and MRAs of the brain, to make sure that there are no other causes of his syncope. He will be referred to hematology for hypercoagulable work-up because of his generalized history of venous thrombosis and hypercoagulable syndrome  We advised the patient that the single most important thing is not to hit his head again. Repeated head injuries, causes traumatic encephalopathies. Because of his syncope he was referred to cardiology for further evaluation to rule out cardiac cause advised not to drive for 6-month until we make sure he is symptom-free. He will call if there is any further problem, he will continue to gradually try to increase his activities and get back into a more normal level of functioning as tolerated with precautions because of his anticoagulation because of his clotting syndrome. Further treatment evaluation will depend results of his clinical course and response to therapy and other needs, he will call us immediately if any problem or questions about his condition or tests  62 minutes spent with the patient going over all this in detail, he is obvious he is at a high risk for further complications because of his clotting disorder and syncopal episode and further neurologic injury and dysfunction if he should seen again in treatable causes of his symptoms are not found. All of his records from the [facility] rehab center that he had were reviewed, and his CT scans reviewed all personally on the disc he brought with him, and I agree with all reports, and diagnoses as dictated, and because of all of this his treatment and evaluation and further testing is as dictated above. Signed By: Zulma Palencia MD     July 6, 2022       CC:  Sebastian Jean Baptiste, [address]  FAX: [phone]

## 2022-07-08 ENCOUNTER — TELEPHONE (OUTPATIENT)
Dept: NEUROLOGY | Age: 57
End: 2022-07-08

## 2022-07-08 NOTE — TELEPHONE ENCOUNTER
Pt called to let Nurse know he would like to go to [facility] ([facility]).     [phone]

## 2022-07-08 NOTE — TELEPHONE ENCOUNTER
Spoke with patient. Verified patient with two patient identifiers. Advised he needs to check with his ins company to see who is in-network so we can fax referral and records to them. He will check and let us know.

## 2022-07-12 NOTE — TELEPHONE ENCOUNTER
Called pt,verified pt with two pt identifiers, advised pt I will fax his referral over to [facility] today and they should reach out to him in about 1 week. Advised if he does not hear anything from them in that time frame to call them himself to schedule. Pt verbalized understanding. Faxed PT referral to [facility] building for therapy at [phone] and received fax confirmation.

## 2022-07-14 ENCOUNTER — HOSPITAL ENCOUNTER (OUTPATIENT)
Dept: NEUROLOGY | Age: 57
Discharge: HOME | End: 2022-07-14
Attending: PSYCHIATRY & NEUROLOGY
Payer: MEDICAID

## 2022-07-14 DIAGNOSIS — R55 MICTURITION SYNCOPE: ICD-10-CM

## 2022-07-14 DIAGNOSIS — I67.89 CEREBRAL MICROVASCULAR DISEASE: ICD-10-CM

## 2022-07-14 DIAGNOSIS — F07.81 POST-CONCUSSION VERTIGO: ICD-10-CM

## 2022-07-14 DIAGNOSIS — F07.81 POST CONCUSSION SYNDROME: ICD-10-CM

## 2022-07-14 DIAGNOSIS — R42 POST-CONCUSSION VERTIGO: ICD-10-CM

## 2022-07-14 DIAGNOSIS — R55 CONVULSIVE SYNCOPE: ICD-10-CM

## 2022-07-14 DIAGNOSIS — I65.23 BILATERAL CAROTID ARTERY STENOSIS: ICD-10-CM

## 2022-07-14 DIAGNOSIS — R41.82 ACUTE ALTERATION IN MENTAL STATUS: ICD-10-CM

## 2022-07-14 DIAGNOSIS — D68.9 CLOTTING DISORDER (HCC): ICD-10-CM

## 2022-07-14 PROCEDURE — 95714 VEEG EA 12-26 HR UNMNTR: CPT

## 2022-07-19 ENCOUNTER — HOSPITAL ENCOUNTER (OUTPATIENT)
Dept: MRI IMAGING | Age: 57
Discharge: HOME | End: 2022-07-19
Attending: PSYCHIATRY & NEUROLOGY

## 2022-07-19 DIAGNOSIS — R42 POST-CONCUSSION VERTIGO: ICD-10-CM

## 2022-07-19 DIAGNOSIS — R55 CONVULSIVE SYNCOPE: ICD-10-CM

## 2022-07-19 DIAGNOSIS — F07.81 POST-CONCUSSION VERTIGO: ICD-10-CM

## 2022-07-19 DIAGNOSIS — I67.89 CEREBRAL MICROVASCULAR DISEASE: ICD-10-CM

## 2022-07-19 DIAGNOSIS — R41.82 ACUTE ALTERATION IN MENTAL STATUS: ICD-10-CM

## 2022-07-19 DIAGNOSIS — I65.23 BILATERAL CAROTID ARTERY STENOSIS: ICD-10-CM

## 2022-07-19 DIAGNOSIS — D68.9 CLOTTING DISORDER (HCC): ICD-10-CM

## 2022-07-19 DIAGNOSIS — F07.81 POST CONCUSSION SYNDROME: ICD-10-CM

## 2022-07-19 DIAGNOSIS — R55 MICTURITION SYNCOPE: ICD-10-CM

## 2022-07-24 PROCEDURE — 95816 EEG AWAKE AND DROWSY: CPT | Performed by: PSYCHIATRY & NEUROLOGY

## 2022-07-24 NOTE — PROCEDURES
[facility] [place] [address]  EEG    Name:  Missy Emery  MR#:  [id number]  :  1965  ACCOUNT #:  [de-identified]  DATE OF SERVICE:  07/15/2022      INDICATION:  This patient is a 81-year-old male with history of passing out spells, possible seizures, possible convulsive syncope, possible epilepsy. The patient for an EEG to rule out seizures, rule out cortical abnormality, and rule out possible complex seizures. EEG CLASSIFICATION:  This patient is essentially normal, awake, and drowsy electroencephalogram.    Initially we supposed to do a 24-hour EEG recording, but the study apparently was placed on the patient around 2022 at approximately 11 a.m. and was taken off at approximately 2022 at 12 p.m. roughly for a total time of study about 1 hour. During the study, the patient had a posteriorly located occipital alpha rhythm of 8-9 Hz that did attenuate some with eye opening. Hyperventilation was not performed. Photic stimulation was not performed. There was no considerable movement of muscle and electrode artifact in the recording, but the study overall was interpretable. There were no clear stages of sleep with brief stages and drowsiness seen. Again, no clear focal abnormality, no clear dysrhythmic activity or electrographic spells of any type were seen. On the patient's clinical diary during that time, there was no note of any type of abnormality suggesting a seizure either. INTERPRETATION:  This is an essentially normal routine EEG supposed to be 24-hour EEG, but for some reason was attenuated and 1 hour. During this time, the patient had no clear areas of focal slowing, no clear spike or spike wave discharges, and no evidence of electrographic or dysrhythmic spells of any type seen. Clinical correlation is recommended. Miky Bernal MD      [id number]  D:  2022 13:31  T:  2022 18:54  JOB #:  [id number]  CC:   Libby Schneider MD

## 2022-08-09 DIAGNOSIS — I67.89 CEREBRAL MICROVASCULAR DISEASE: ICD-10-CM

## 2022-08-09 DIAGNOSIS — F07.81 POST CONCUSSION SYNDROME: Primary | ICD-10-CM

## 2022-08-09 DIAGNOSIS — F07.81 POST-CONCUSSION VERTIGO: ICD-10-CM

## 2022-08-09 DIAGNOSIS — R42 POST-CONCUSSION VERTIGO: ICD-10-CM

## 2022-08-09 RX ORDER — LORAZEPAM 0.5 MG/1
TABLET ORAL
Qty: 5 TABLET | Refills: 0 | Status: SHIPPED | OUTPATIENT
Start: 2022-08-09 | End: 2022-11-03 | Stop reason: SDUPTHER

## 2022-08-26 ENCOUNTER — HOSPITAL ENCOUNTER (OUTPATIENT)
Dept: MRI IMAGING | Age: 57
Discharge: HOME | End: 2022-08-26
Attending: PSYCHIATRY & NEUROLOGY
Payer: MEDICAID

## 2022-08-26 PROCEDURE — A9576 INJ PROHANCE MULTIPACK: HCPCS | Performed by: PSYCHIATRY & NEUROLOGY

## 2022-08-26 PROCEDURE — 74011250636 HC RX REV CODE- 250/636: Performed by: PSYCHIATRY & NEUROLOGY

## 2022-08-26 PROCEDURE — 70553 MRI BRAIN STEM W/O & W/DYE: CPT

## 2022-08-26 RX ADMIN — GADOTERIDOL 20 ML: 279.3 INJECTION, SOLUTION INTRAVENOUS at 17:00

## 2022-08-28 ENCOUNTER — DOCUMENTATION ONLY (OUTPATIENT)
Dept: NEUROLOGY | Age: 57
End: 2022-08-28

## 2022-08-29 NOTE — PROGRESS NOTES
I called the patient, explained his MRI scan probably showed some subacute infarcts in the brainstem on the right jonel and left pontomedullary junction, and they recommended repeat scan in 6 weeks, so we will see him again in 1 October with nurse practitioner Aida Wesley, and plan on repeating the MRI scan then decide on further therapy, he said fine, he is doing well otherwise, we will see him back then.

## 2022-10-06 ENCOUNTER — OFFICE VISIT (OUTPATIENT)
Dept: NEUROLOGY | Age: 57
End: 2022-10-06
Payer: MEDICAID

## 2022-10-06 VITALS
WEIGHT: 218 LBS | BODY MASS INDEX: 29.53 KG/M2 | SYSTOLIC BLOOD PRESSURE: 128 MMHG | DIASTOLIC BLOOD PRESSURE: 90 MMHG | HEART RATE: 87 BPM | HEIGHT: 72 IN | OXYGEN SATURATION: 98 % | RESPIRATION RATE: 16 BRPM

## 2022-10-06 DIAGNOSIS — R93.89 ABNORMAL MRI: Primary | ICD-10-CM

## 2022-10-06 DIAGNOSIS — Z86.73 HISTORY OF CVA (CEREBROVASCULAR ACCIDENT): ICD-10-CM

## 2022-10-06 DIAGNOSIS — R55 SYNCOPE AND COLLAPSE: ICD-10-CM

## 2022-10-06 PROCEDURE — 99214 OFFICE O/P EST MOD 30 MIN: CPT | Performed by: NURSE PRACTITIONER

## 2022-10-06 NOTE — LETTER
10/6/2022 8:36 AM    Patient:  Dione Ibarra   YOB: 1965  Date of Visit: 10/6/2022      Dear Dione Ibarra  [address]: To whom it may concern: Dione Ibarra is currently under the care of [address] [place] [address]. Due to ongoing medical issues, the patient is requiring supplemental testing that will prolong his ability to return to work, next follow up appointment with Dr Madilyn Brittle is 2/14/2023. We ask that the return date be extended until completion of these tests and this follow up appt. If there are questions or concerns please have the patient contact our office.       Sincerely,      Nick Greene NP

## 2022-10-06 NOTE — PROGRESS NOTES
[facility]  [place]   [address]  Tel: [phone]  Fax: [phone]      Date:  10/06/22     Name:  Torrance Gaucher  :  1965  MRN:  [id number]     PCP:  TIMBO Anderson    Chief Complaint   Patient presents with    Follow-up     Doing well,dr. Sasha Mijares stated patient had a stroke and was told to f/up       HISTORY OF PRESENT ILLNESS:  Patient presents today for 3 month follow up. Patient notes he has been doing well. He feels fine he has no complaints of note. Only the one episode on 2022, no issues since then. No doing therapy at this time. He has had no issues with dizziness  Exercises often ,jogs often. Non smoker. Was a , end of the month would be 6 months of no driving. The day would be 10/29/2022, his concern is that his [employer] licensing group can be more problematic due to dates, he would like a letter stating that he has not been fully released at this time. Saw Hematology:  pt to continue blood thinners due to history of DVT. 1 episode that lasted a couple minutes where he had tachycardia. But notes he was jogging during that time, so no significant findings noted. 2022 EEG: INTERPRETATION:  This is an essentially normal routine EEG supposed to be 24-hour EEG, but for some reason was attenuated and 1 hour. During this time, the patient had no clear areas of focal slowing, no clear spike or spike wave discharges, and no evidence of electrographic or dysrhythmic spells of any type seen. Clinical correlation is recommended. Recap from last visit 2022 with Dr Sasha Mijares: Patient with complicated problem of micturition syncope possibly in loss of consciousness associated with traumatic subdural hematomas and head injury and postconcussive syndrome and postconcussive vertigo.   Because of his symptoms, we will order an MRI of the brain to make sure that there are no gross as diagnosed by one of his doctors, make sure there is no complication from his bleeding on his anticoagulation. He is referred to vestibular therapy for the postconcussive vertigo, and was advised not to drive for 6 months after an unexplained loss of consciousness. Because of the syncopal episode, we will obtain a 24-hour ambulatory EEG and a carotid Doppler exam in addition to the MRIs and MRAs of the brain, to make sure that there are no other causes of his syncope. He will be referred to hematology for hypercoagulable work-up because of his generalized history of venous thrombosis and hypercoagulable syndrome  We advised the patient that the single most important thing is not to hit his head again. Repeated head injuries, causes traumatic encephalopathies. Because of his syncope he was referred to cardiology for further evaluation to rule out cardiac cause advised not to drive for 6-month until we make sure he is symptom-free. He will call if there is any further problem, he will continue to gradually try to increase his activities and get back into a more normal level of functioning as tolerated with precautions because of his anticoagulation because of his clotting syndrome. REVIEW OF SYSTEMS:     Review of Systems   Eyes:  Positive for blurred vision (at times,nothing out of the normal since cataract surgery). Musculoskeletal:  Positive for back pain. Negative for falls (no recent falls). Neurological:  Positive for speech change (trouble finding words at times) and headaches (only if he drinks too much sugar). Negative for dizziness, tingling (had some tingling in his fingers), sensory change, seizures and weakness. Psychiatric/Behavioral:  Negative for depression and memory loss. The patient is not nervous/anxious and does not have insomnia. All other systems reviewed and are negative.       Current Outpatient Medications   Medication Sig    LORazepam (ATIVAN) 0.5 mg tablet Take 1 to 2 tablets p.o. 1 to 2 hours prior to MRI scan and during MRI scan as needed for claustrophobia    butalbital-acetaminophen-caffeine (FIORICET, ESGIC) -40 mg per tablet Take 1 Tablet by mouth. Xarelto 20 mg tab tablet TAKE 1 TABLET BY MOUTH EVERY DAY WITH THE EVENING MEAL    cholo's wort 300 mg cap     SUMAtriptan (IMITREX) 100 mg tablet take 1 tablet by oral route once with fluids as early as possible after the onset of a migraine - may repeat after 2 hr- max 200mg/day    tamsulosin (FLOMAX) 0.4 mg capsule Take 1 Capsule by mouth. valACYclovir (VALTREX) 1 gram tablet TAKE 1 TABLET BY MOUTH EVERY DAY AT ONSET OF SYMPTOMS FOR 5 DAYS    aspirin (ASPIRIN) 325 mg tablet Take 325 mg by mouth daily. No current facility-administered medications for this visit. Allergies   Allergen Reactions    Levofloxacin Shortness of Breath and Swelling    Atorvastatin Myalgia    Mold Itching    Sulfabenzamide Other (comments)    Amitriptyline Rash     Past Medical History:   Diagnosis Date    Blood clot associated with vein wall inflammation 2014    Cerebral artery occlusion with cerebral infarction ([facility] [address]     Giant cell tumor     leg    Headache     Memory disorder Only after April 29. It is improving every day.     Migraine     Syncope and collapse      Past Surgical History:   Procedure Laterality Date    HX ANKLE FRACTURE TX Left 1997    HX HERNIA REPAIR  2010    HX HERNIA REPAIR  2011    HX TONSILLECTOMY  1975    HX TUMOR REMOVAL Left 1990    ankle - Giant Cell tumor removed     Social History     Socioeconomic History    Marital status:      Spouse name: Not on file    Number of children: Not on file    Years of education: Not on file    Highest education level: Not on file   Occupational History    Not on file   Tobacco Use    Smoking status: Never    Smokeless tobacco: Never    Tobacco comments:     never used tobacco   Vaping Use    Vaping Use: Never used   Substance and Sexual Activity    Alcohol use: Never    Drug use: Never    Sexual activity: Yes     Partners: Female     Birth control/protection: Pill   Other Topics Concern    Not on file   Social History Narrative    Not on file     Social Determinants of Health     Financial Resource Strain: Not on file   Food Insecurity: Not on file   Transportation Needs: Not on file   Physical Activity: Not on file   Stress: Not on file   Social Connections: Not on file   Intimate Partner Violence: Not on file   Housing Stability: Not on file     Family History   Problem Relation Age of Onset    Heart Disease Father     Stroke Father     Attention Deficit Disorder Sister     COPD Maternal Uncle     Alzheimer's Disease Paternal Aunt     Cancer Paternal Uncle     Diabetes Maternal Grandmother     Alcohol abuse Maternal Grandfather     Alcohol abuse Paternal Grandfather      MRI Results (most recent):  Results from Hospital Encounter encounter on 07/19/22    MRI BRAIN W WO CONT    Narrative  EXAM:  MRI BRAIN W WO CONT    INDICATION:    syncope needs open unit for claustrophobia    COMPARISON:  CT head 6/23/2022. CONTRAST: 20 ml ProHance. TECHNIQUE:  Multiplanar multisequence acquisition without and with contrast of the brain. FINDINGS:  There is moderate ventriculomegaly, which is out of portion to cerebral volume  loss. There is no periventricular edema. There is a small chronic infarct in the  left cerebellum. There is a T2/FLAIR hyperintensity in the left middle  cerebellar peduncle measuring 6 mm, without associated restricted diffusion or  enhancement. There is an enhancing lesion in the right pontomedullary junction  measuring 5 mm (series 10 image 8), with mild associated T2/FLAIR  hyperintensity. There is a similar 11 mm enhancing lesion in the left ventral  jonel (series 10 image 10) with mild associated T2/FLAIR hyperintensity. There is  no acute infarct, hemorrhage, extra-axial fluid collection, or mass effect. There is no cerebellar tonsillar herniation.  Expected arterial flow-voids are  present. The paranasal sinuses, mastoid air cells, and middle ears are clear. The orbital  contents are within normal limits with bilateral lens implants. No significant  osseous or scalp lesions are identified. Impression  1. Moderate ventriculomegaly, which is out of proportion to cerebral volume  loss, and suspicious for normal pressure hydrocephalus. Correlate clinically. 2. Small enhancing lesions in the left ventral jonel and right pontomedullary  junction with associated mild T2/FLAIR hyperintensity. These may represent small  subacute infarcts (with a small chronic infarct noted in the left cerebellum). However, differential considerations also include an active demyelinating or  inflammatory/granulomatous process. Follow-up brain MRI in 4-6 weeks without and  with contrast is recommended. 3. A subcentimeter T2/FLAIR hyperintensity in the left middle cerebellar  peduncle, without abnormal enhancement or restricted diffusion. This may  represent a small chronic infarct, sequela of other remote insult, or less  likely a low-grade neoplasm. Attention on follow-up. 4. No acute infarct. 23X    PHYSICAL EXAMINATION:    Visit Vitals  BP (!) 128/90 (BP 1 Location: Left arm, BP Patient Position: Sitting, BP Cuff Size: Adult)   Pulse 87   Resp 16   Ht 6' (1.829 m)   Wt 218 lb (98.9 kg)   SpO2 98%   BMI 29.57 kg/m²     General:  Well defined, nourished, and well groomed individual in no acute distress. Musculoskeletal:  Extremities revealed no edema and had full range of motion of joints. Psych:  Good mood and bright affect    NEUROLOGICAL EXAMINATION:     Mental Status:   Alert and oriented to person, place, and time with recent and remote memory intact. Attention span and concentration are normal. Clear speech. Fund of knowledge preserved. Cranial Nerves:   PERRLA.   Visual fields were full  EOM: no evidence of nystagmus  Facial sensation:  normal and symmetric  Facial motor: normal and symmetric, no facial droop noted. Hearing intact  SCM strength intact  Tongue: midline without fasciculations    Motor Examination: Normal tone. 5/5 muscle strength in bilateral upper and lower extremities. No cogwheel rigidity. No muscle wasting, no twitching or fasciculation noted. Sensory exam:  Normal throughout to light touch in bilateral upper extremities and bilateral lower extremities. Coordination:   Finger to nose and rapid arm movement testing was normal.  No resting or intention tremor. Negative Romberg, negative pronator drift. Gait and Station:  Steady while walking on toes, heels, and with tandem walking. Normal arm swing. Reflexes:  DTRs 2+ in bilateral biceps, brachioradialis, patella . ASSESSMENT AND PLAN      ICD-10-CM ICD-9-CM    1. Abnormal MRI  R93.89 793.99 MRI BRAIN W WO CONT      2. History of CVA (cerebrovascular accident)  Z86.73 V12.54 MRI BRAIN W WO CONT      3. Syncope and collapse  R55 780.2 MRI BRAIN W WO CONT        1. Abnormal MRI: Brain MRI reviewed with patient, per radiologist recommendation we will repeat scan and modify plan of care based upon results. Patient not currently experiencing any neurologic symptoms.  -     MRI BRAIN W WO CONT; Future  2. History of CVA (cerebrovascular accident): MRI reviewed with patient, healthy lifestyle encouraged. Continue medications as indicated, follow-up with relevant providers. Stroke risk factors reviewed with patient.  -     MRI BRAIN W WO CONT; Future  3. Syncope and collapse: Patient notes he only had 1 incident that was in April 29, 2022. EEG results reviewed with patient, repeat brain MRI, modify plan based upon results.   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 Letter written. -     MRI BRAIN W WO CONT; Future    Patient and/or family verbalized understand of all instructions and all questions/concerns were addressed. Safety/side effects of medications discussed. Patient remains a complex patient secondary to polypharmacy, significant comorbid conditions, and use of high-risk medications which complicate the decision making process related to patient's neurologic diagnosis. The patient has an upcoming appointment with Dr. Abad Linker February 2023, he is to return to clinic sooner if needed.     Nathanael Benton, NIDIAP-BC

## 2022-10-06 NOTE — LETTER
NOTIFICATION RETURN TO WORK / SCHOOL    10/6/2022 8:32 AM    Mr. Carmen Blue  [address] [address]      To Whom It May Concern:    Carmen Blue is currently under the care of [address] [place] [address]. Due to ongoing medical issues, the patient is requiring supplemental testing that will prolong his ability to return to work. We ask that the return date be extended until completion of these tests. If there are questions or concerns please have the patient contact our office.         Sincerely,      Juwan Lilly NP

## 2022-10-22 ENCOUNTER — HOSPITAL ENCOUNTER (OUTPATIENT)
Dept: MRI IMAGING | Age: 57
Discharge: HOME | End: 2022-10-22
Attending: NURSE PRACTITIONER
Payer: MEDICAID

## 2022-10-22 DIAGNOSIS — Z86.73 HISTORY OF CVA (CEREBROVASCULAR ACCIDENT): ICD-10-CM

## 2022-10-22 DIAGNOSIS — R93.89 ABNORMAL MRI: ICD-10-CM

## 2022-10-22 DIAGNOSIS — R55 SYNCOPE AND COLLAPSE: ICD-10-CM

## 2022-10-22 PROCEDURE — 70553 MRI BRAIN STEM W/O & W/DYE: CPT

## 2022-10-22 PROCEDURE — A9576 INJ PROHANCE MULTIPACK: HCPCS | Performed by: NURSE PRACTITIONER

## 2022-10-22 PROCEDURE — 74011250636 HC RX REV CODE- 250/636: Performed by: NURSE PRACTITIONER

## 2022-10-22 RX ADMIN — GADOTERIDOL 20 ML: 279.3 INJECTION, SOLUTION INTRAVENOUS at 13:51

## 2022-10-25 ENCOUNTER — TELEPHONE (OUTPATIENT)
Dept: NEUROLOGY | Age: 57
End: 2022-10-25

## 2022-10-25 ENCOUNTER — DOCUMENTATION ONLY (OUTPATIENT)
Dept: NEUROLOGY | Age: 57
End: 2022-10-25

## 2022-10-25 DIAGNOSIS — R93.89 ABNORMAL MRI: Primary | ICD-10-CM

## 2022-10-25 DIAGNOSIS — F07.81 POST CONCUSSION SYNDROME: ICD-10-CM

## 2022-10-25 DIAGNOSIS — R55 CONVULSIVE SYNCOPE: ICD-10-CM

## 2022-10-25 NOTE — TELEPHONE ENCOUNTER
I called the patient, we discussed his abnormal MRI and the possibility of MS, or other inflammatory lesions, but he is doing perfectly fine, has no symptoms and wants to get back to work as soon as possible, and I advised him we should probably get an MRI of the cervical spine to rule out any MS lesions in the spine, and if that was abnormal we probably definitely need to get a spinal tap, but if that is negative we could we just repeat an MRI of the brain again and may be 3 months to see if there is any change or proceed with the spinal tap and just hold the Xarelto for 48 hours. We advised the patient does not to clear him for driving until we get the MRI of the cervical spine and make a decision on what to do.

## 2022-10-25 NOTE — PROGRESS NOTES
I called the patient, no answer, left message to call me back, about the results of his MRI scan, probably needs one of the cervical spine to rule out demyelinating disease, he may need a spinal tap but that may be difficult because he is on Xarelto for recurrent DVT we will have to stop that before we can tap him. We could just repeat the MRI of the brain and other 4 to 6 weeks.

## 2022-11-02 ENCOUNTER — HOSPITAL ENCOUNTER (OUTPATIENT)
Dept: MRI IMAGING | Age: 57
Discharge: HOME | End: 2022-11-02
Attending: PSYCHIATRY & NEUROLOGY

## 2022-11-02 DIAGNOSIS — R93.89 ABNORMAL MRI: ICD-10-CM

## 2022-11-02 DIAGNOSIS — R55 CONVULSIVE SYNCOPE: ICD-10-CM

## 2022-11-02 DIAGNOSIS — F07.81 POST CONCUSSION SYNDROME: ICD-10-CM

## 2022-11-03 DIAGNOSIS — F07.81 POST CONCUSSION SYNDROME: ICD-10-CM

## 2022-11-03 DIAGNOSIS — F07.81 POST-CONCUSSION VERTIGO: ICD-10-CM

## 2022-11-03 DIAGNOSIS — I67.89 CEREBRAL MICROVASCULAR DISEASE: ICD-10-CM

## 2022-11-03 DIAGNOSIS — R42 POST-CONCUSSION VERTIGO: ICD-10-CM

## 2022-11-04 RX ORDER — LORAZEPAM 0.5 MG/1
TABLET ORAL
Qty: 5 TABLET | Refills: 0 | Status: SHIPPED | OUTPATIENT
Start: 2022-11-04

## 2022-11-04 NOTE — TELEPHONE ENCOUNTER
Requested Prescriptions     Pending Prescriptions Disp Refills    LORazepam (ATIVAN) 0.5 mg tablet 5 Tablet 0     Sig: Take 1 to 2 tablets p.o. 1 to 2 hours prior to MRI scan and during MRI scan as needed for claustrophobia     Last office visit: 10/6/22    Last refill: 8/9/2022    Next appointment: 2/14/2023    Please review and fill if warranted.

## 2022-11-29 ENCOUNTER — HOSPITAL ENCOUNTER (OUTPATIENT)
Dept: MRI IMAGING | Age: 57
Discharge: HOME | End: 2022-11-29
Attending: PSYCHIATRY & NEUROLOGY
Payer: MEDICAID

## 2022-11-29 VITALS — WEIGHT: 210 LBS | BODY MASS INDEX: 28.48 KG/M2

## 2022-11-29 PROCEDURE — 74011250636 HC RX REV CODE- 250/636: Performed by: PSYCHIATRY & NEUROLOGY

## 2022-11-29 PROCEDURE — A9576 INJ PROHANCE MULTIPACK: HCPCS | Performed by: PSYCHIATRY & NEUROLOGY

## 2022-11-29 PROCEDURE — 72156 MRI NECK SPINE W/O & W/DYE: CPT

## 2022-11-29 RX ADMIN — GADOTERIDOL 19 ML: 279.3 INJECTION, SOLUTION INTRAVENOUS at 20:12

## 2022-11-30 ENCOUNTER — DOCUMENTATION ONLY (OUTPATIENT)
Dept: NEUROLOGY | Age: 57
End: 2022-11-30

## 2022-11-30 NOTE — PROGRESS NOTES
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 He agrees with plans and we will see him in February and reorder the scan.

## 2023-02-14 ENCOUNTER — OFFICE VISIT (OUTPATIENT)
Dept: NEUROLOGY | Age: 58
End: 2023-02-14
Payer: MEDICAID

## 2023-02-14 VITALS
BODY MASS INDEX: 30.02 KG/M2 | OXYGEN SATURATION: 97 % | SYSTOLIC BLOOD PRESSURE: 130 MMHG | RESPIRATION RATE: 18 BRPM | HEART RATE: 103 BPM | DIASTOLIC BLOOD PRESSURE: 80 MMHG | HEIGHT: 72 IN | WEIGHT: 221.6 LBS | TEMPERATURE: 97.9 F

## 2023-02-14 DIAGNOSIS — I67.89 CEREBRAL MICROVASCULAR DISEASE: ICD-10-CM

## 2023-02-14 DIAGNOSIS — G43.009 MIGRAINE WITHOUT AURA AND WITHOUT STATUS MIGRAINOSUS, NOT INTRACTABLE: ICD-10-CM

## 2023-02-14 DIAGNOSIS — I65.23 BILATERAL CAROTID ARTERY STENOSIS: Primary | ICD-10-CM

## 2023-02-14 DIAGNOSIS — G37.9 DEMYELINATING DISEASE OF CENTRAL NERVOUS SYSTEM (HCC): ICD-10-CM

## 2023-02-14 PROCEDURE — 99214 OFFICE O/P EST MOD 30 MIN: CPT | Performed by: PSYCHIATRY & NEUROLOGY

## 2023-02-14 RX ORDER — LORAZEPAM 0.5 MG/1
TABLET ORAL
Qty: 5 TABLET | Refills: 0 | Status: SHIPPED | OUTPATIENT
Start: 2023-02-14

## 2023-02-14 NOTE — LETTER
2/14/2023    Patient: José Miguel Burch   YOB: 1965   Date of Visit: 2/14/2023     [address] [phone]  Via Fax: [phone]    Dear TIMBO Osborne,      Thank you for referring Mr. José Miguel Burch to [address] [place] [address] for evaluation. My notes for this consultation are attached. Chief Complaint   Patient presents with    Follow-up     S/p CVA - doing \"wonderful\"     1. Have you been to the ER, urgent care clinic since your last visit? Hospitalized since your last visit? No     2. Have you seen or consulted any other health care providers outside of the [address] [place] [address] since your last visit? Include any pap smears or colon screening. Yes PCP      Consult  REFERRED BY:  TIMBO Tavarez    CHIEF COMPLAINT: History of fall and passing out and head injury and traumatic subdural hematoma with persistent posttraumatic vertigo      Subjective:     José Miguel Burch is a 65 y.o. right-handed  male, seen at the request of TIMBO Walker for evaluation of new problem of patient having abnormal MRI of the brain last summer that showed some questionable new infarcts in the jonel and brainstem area, and a developmental venous anomaly of the brainstem, and prominent white matter disease and some question of having MS, so he had that repeated again September or October and that did not show any new strokes, but still suggested possible demyelinating disease, and had an MRI of the cervical spine done which showed no cord lesions or MS lesions, but did show moderate arthritis. He is brought back today to consider repeating his scans. We mention spinal tap last time but he did not want that because he is on anticoagulation and feels fine and his vertigo is all better and he wants to drive again if he feels probably back to normal, and besides his head trauma never really had much problem before that.   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, that occurred April 29, 2022, and then resting, and then getting up to go to the bathroom late at night, felt lightheaded and dizzy, and tried 3 times, and family just went to the bathroom and was voiding when he passed out and hit his head on the toilet and the floor and sustained a laceration to the occipital region associated with subdural hematomas along the tentorium and falx, that left him with confusion and needing inpatient hospitalization for about 2 weeks, and rehab at the [facility] rehab center where he was an inpatient for 1 week, and then his Workmen's Comp. cut off and he went home. He was supposed to follow-up with neurosurgery but never did, and does not follow-up with any neuro specialists so far. 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 He never required surgical intervention, but did require Xarelto as anticoagulation. He had a recent repeat CT scan of the head as follow-up June 23, 2022 that just showed prominent size ventricles likely central atrophy but no other structural lesion or evidence of hematoma or bleed. He was told he probably had a stroke, but then his other doctor told him he did not, and he is a little concerned.   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 He has no focal weakness or sensory loss and has no other neurologic deficit other than mainly that positional vertigo when he gets up or moves quickly or turns his head, and this has persisted though it is somewhat better, so we will send him to vestibular rehab for that. Because of his episode of micturition syncope, he had a normal ambulatory 24-hour EEG to rule out other causes for seizures as a cause of his passing out and today carotid Doppler study and imaging of the brain to rule out structural causes of his passing out episode he was referred to cardiology also for evaluation to make sure there is no other cardiac source. Because of his clots and previous history of clots in his legs, he was sent to hematology to be evaluated to rule out clotting disorder in addition. He did not think he saw a hematologist and does not think he ever had an MRI of the brain done we need 1 of those to evaluate because of all of his neurologic symptoms. 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 He needs to stay on chronic anticoagulation. Past Medical History:   Diagnosis Date    Blood clot associated with vein wall inflammation 2014    Cerebral artery occlusion with cerebral infarction (HCC)     Giant cell tumor     leg    Headache     Memory disorder Only after April 29. It is improving every day.     Migraine     Syncope and collapse       Past Surgical History:   Procedure Laterality Date    HX ANKLE FRACTURE TX Left 1997    [address] HERNIA REPAIR  2010    HX HERNIA REPAIR  2011    HX TONSILLECTOMY  1975    HX TUMOR REMOVAL Left 1990    ankle - Giant Cell tumor removed     Family History   Problem Relation Age of Onset    Heart Disease Father     Stroke Father     Attention Deficit Disorder Sister     COPD Maternal Uncle     Alzheimer's Disease Paternal Aunt     Cancer Paternal Uncle     Diabetes Maternal Grandmother     Alcohol abuse Maternal Grandfather     Alcohol abuse Paternal Grandfather       Social History     Tobacco Use    Smoking status: Never    Smokeless tobacco: Never    Tobacco comments:     never used tobacco   Substance Use Topics    Alcohol use: Never         Current Outpatient Medications:     pitavastatin calcium (LIVALO) 2 mg tablet, , Disp: , Rfl:     LORazepam (ATIVAN) 0.5 mg tablet, 1 to 2 tablets p.o. 1 to 2 hours prior to MRI scan and during as needed, Disp: 5 Tablet, Rfl: 0    LORazepam (ATIVAN) 0.5 mg tablet, Take 1 to 2 tablets p.o. 1 to 2 hours prior to MRI scan and during MRI scan as needed for claustrophobia, Disp: 5 Tablet, Rfl: 0    butalbital-acetaminophen-caffeine (FIORICET, ESGIC) -40 mg per tablet, Take 1 Tablet by mouth., Disp: , Rfl:     Xarelto 20 mg tab tablet, TAKE 1 TABLET BY MOUTH EVERY DAY WITH THE EVENING MEAL, Disp: , Rfl:     cholo's wort 300 mg cap, , Disp: , Rfl:     SUMAtriptan (IMITREX) 100 mg tablet, take 1 tablet by oral route once with fluids as early as possible after the onset of a migraine - may repeat after 2 hr- max 200mg/day, Disp: , Rfl:     tamsulosin (FLOMAX) 0.4 mg capsule, Take 1 Capsule by mouth., Disp: , Rfl:     valACYclovir (VALTREX) 1 gram tablet, TAKE 1 TABLET BY MOUTH EVERY DAY AT ONSET OF SYMPTOMS FOR 5 DAYS, Disp: , Rfl:     aspirin (ASPIRIN) 325 mg tablet, Take 325 mg by mouth as needed.  Very occassional, Disp: , Rfl:         Allergies   Allergen Reactions    Levofloxacin Shortness of Breath and Swelling    Atorvastatin Myalgia    Mold Itching    Sulfabenzamide Other (comments)    Amitriptyline Rash      MRI Results (most recent):  Results from Hospital Encounter encounter on 11/02/22    MRI CERV SPINE W WO CONT    Narrative  EXAM: MRI CERV SPINE W WO CONT  Clinical history: Evaluate for cervical cord demyelinating disease burden  INDICATION: R/O MS. Abnormal findings on diagnostic imaging of other specified  body structures    COMPARISON: Correlation with brain MRI 10/22/2022    TECHNIQUE: MR imaging of the cervical spine was performed using the following  sequences: sagittal T1, T2, STIR;  axial T2, T1 prior to and following contrast  administration. CONTRAST: ProHance    FINDINGS:    T2 hyperintense and enhancing foci at the pontomedullary junction  pontomesencephalic junction with abnormal increased T2 signal intensity focus in  the left cerebellum as well. There is no evidence of cord signal abnormality in  the cervical cord allowing for technical limitations of exam. Marrow signal is  normal.  History of motion on the axial T2 star March sequence which slightly limits  sensitivity for detection of abnormal cord signal foci. The craniocervical junction is intact. There is trace retrolisthesis of C3 on  C4. There is no pathologic intrathecal enhancement. The paraspinal soft tissues are within normal limits. C2-C3: No herniation or stenosis. C3-C4: Disc desiccation and loss of disc height. Mild facet arthropathy. Circumferential bulge/osteophyte. Mild to moderate canal and moderate bilateral  foraminal stenoses. C4-C5: Disc desiccation. Disc bulge/osteophyte. Mild canal and left foraminal  stenosis. C5-C6: Disc desiccation. Disc protrusion/osteophyte. Mild canal stenosis. Foramina are patent    C6-C7: Disc desiccation. Mild facet arthropathy. Right foraminal  protrusion/osteophyte. The canal is patent. There is severe right and moderate  to severe left foraminal stenosis. C7-T1: No herniation or stenosis. Central disc protrusion at T2-T3.  No canal or foraminal compromise. Impression  There is no definitive cervical cord demyelinating disease burden. Abnormal FLAIR signal intensity and postcontrast enhancement identified in the  jonel is not significantly changed as seen on brain MRI of 10/22/2022. Multilevel disc and facet degenerative change of the cervical spine. There is mild to moderate canal and moderate bilateral foraminal stenosis at  C3-4. Severe right and moderate to severe left foraminal stenosis at C6-7. Additional, less severe degenerative findings are as described in detail above. Results from Hospital Encounter encounter on 11/02/22    MRI CERV SPINE W WO CONT    Narrative  EXAM: MRI CERV SPINE W WO CONT  Clinical history: Evaluate for cervical cord demyelinating disease burden  INDICATION: R/O MS. Abnormal findings on diagnostic imaging of other specified  body structures    COMPARISON: Correlation with brain MRI 10/22/2022    TECHNIQUE: MR imaging of the cervical spine was performed using the following  sequences: sagittal T1, T2, STIR;  axial T2, T1 prior to and following contrast  administration. CONTRAST: ProHance    FINDINGS:    T2 hyperintense and enhancing foci at the pontomedullary junction  pontomesencephalic junction with abnormal increased T2 signal intensity focus in  the left cerebellum as well. There is no evidence of cord signal abnormality in  the cervical cord allowing for technical limitations of exam. Marrow signal is  normal.  History of motion on the axial T2 star March sequence which slightly limits  sensitivity for detection of abnormal cord signal foci. The craniocervical junction is intact. There is trace retrolisthesis of C3 on  C4. There is no pathologic intrathecal enhancement. The paraspinal soft tissues are within normal limits. C2-C3: No herniation or stenosis. C3-C4: Disc desiccation and loss of disc height. Mild facet arthropathy. Circumferential bulge/osteophyte.  Mild to moderate canal and moderate bilateral  foraminal stenoses. C4-C5: Disc desiccation. Disc bulge/osteophyte. Mild canal and left foraminal  stenosis. C5-C6: Disc desiccation. Disc protrusion/osteophyte. Mild canal stenosis. Foramina are patent    C6-C7: Disc desiccation. Mild facet arthropathy. Right foraminal  protrusion/osteophyte. The canal is patent. There is severe right and moderate  to severe left foraminal stenosis. C7-T1: No herniation or stenosis. Central disc protrusion at T2-T3. No canal or foraminal compromise. Impression  There is no definitive cervical cord demyelinating disease burden. Abnormal FLAIR signal intensity and postcontrast enhancement identified in the  jonel is not significantly changed as seen on brain MRI of 10/22/2022. Multilevel disc and facet degenerative change of the cervical spine. There is mild to moderate canal and moderate bilateral foraminal stenosis at  C3-4. Severe right and moderate to severe left foraminal stenosis at C6-7. Additional, less severe degenerative findings are as described in detail above. Review of Systems:  A comprehensive review of systems was negative except for: Constitutional: positive for fatigue and malaise  Ears, nose, mouth, throat, and face: positive for Postconcussive vertigo  Cardiovascular: positive for near-syncope, syncope, dizziness  Musculoskeletal: positive for myalgias, arthralgias and stiff joints  Neurological: positive for headaches, dizziness, vertigo, memory problems and coordination problems  Behvioral/Psych: positive for anxiety and depression   Vitals:    02/14/23 1330   BP: 130/80   Pulse: (!) 103   Resp: 18   Temp: 97.9 °F (36.6 °C)   TempSrc: Temporal   SpO2: 97%   Weight: 221 lb 9.6 oz (100.5 kg)   Height: 6' (1.829 m)     Objective:     I      NEUROLOGICAL EXAM:    Appearance: The patient is well developed, well nourished, provides a coherent history and is in no acute distress.    Mental Status: Oriented to time, place and person, and the president, cognitive function is normal and speech is fluent and no aphasia or dysarthria. Mood and affect appropriate. Cranial Nerves:   Intact visual fields. Fundi are benign, disc are flat, no lesions seen on funduscopy. SUDHEER, EOM's full, no nystagmus, no ptosis. Facial sensation is normal. Corneal reflexes are not tested. Facial movement is symmetric. Hearing is normal bilaterally. Palate is midline with normal sternocleidomastoid and trapezius muscles are normal. Tongue is midline. Neck without meningismus or bruits  Temporal arteries are not tender or enlarged  TMJ areas are not tender on palpation   Motor:  5/5 strength in upper and lower proximal and distal muscles. Normal bulk and tone. No fasciculations. Rapid alternating movement is symmetric and intact bilaterally   Reflexes:   Deep tendon reflexes 2+/4 and symmetrical.  No babinski or clonus present  Patient developed positional vertigo on Hallpike Josey maneuver. Sensory:   Normal to touch, pinprick and vibration and temperature. DSS is intact   Gait:  Normal gait for patient's age. Tremor:   No tremor noted. Cerebellar:   Mildly abnormal cerebellar signs present on Romberg and tandem testing and finger-nose-finger exam.   Neurovascular:  Normal heart sounds and regular rhythm, peripheral pulses intact, and no carotid bruits. Assessment:       ICD-10-CM ICD-9-CM    1. Bilateral carotid artery stenosis  I65.23 433.10 MRI BRAIN W WO CONT     433.30 LORazepam (ATIVAN) 0.5 mg tablet      2. Cerebral microvascular disease  I67.89 437.8 MRI BRAIN W WO CONT      LORazepam (ATIVAN) 0.5 mg tablet      3. Migraine without aura and without status migrainosus, not intractable  G43.009 346.10 MRI BRAIN W WO CONT      LORazepam (ATIVAN) 0.5 mg tablet      4.  Demyelinating disease of central nervous system (HCC)  G37.9 341.9 MRI BRAIN W WO CONT      LORazepam (ATIVAN) 0.5 mg tablet        Active Problems:    * No active hospital problems. *      Plan:     Patient with complicated problem of micturition syncope possibly in loss of consciousness associated with traumatic subdural hematomas and head injury and postconcussive syndrome and postconcussive vertigo. He went to vestibular therapy and improved significantly and has no more vertigo and wants to drive. He had an MRI of the brain that showed some questionable new strokes in August 2022, and had that repeated again in October which showed no new strokes but did suggest white matter lesions of the brain could be considered consistent with MS possibly, he did not want a spinal tap so we will repeat his MRI today to follow this, to see whether he needs 1, his MRI of the cervical spine just showed arthritis but no white matter lesions of the cord. He has never had previous neurological problems till this fall. He has a clotting disorder of the prothrombin gene has to take chronic anticoagulation rest of his life. For his syncope he had a normal carotid Doppler and a normal ambulatory 24-hour EEG supposedly normal cardiac evaluation. He will be referred to hematology for hypercoagulable work-up because of his generalized history of venous thrombosis and hypercoagulable syndrome  We advised the patient that the single most important thing is not to hit his head again. Repeated head injuries, causes traumatic encephalopathies. Because of his syncope he was referred to cardiology for further evaluation to rule out cardiac cause advised not to drive for 6-month until we make sure he is symptom-free. If his MRI of the brain is abnormal we may need to repeat the cervical 1 2.   Further treatment evaluation will depend results of his clinical course and response to therapy and other needs, he will call us immediately if any problem or questions about his condition or tests  38 minutes spent with the patient going over all this in detail, he is obvious he is at a high risk for further complications because of his clotting disorder and syncopal episode and further neurologic injury and dysfunction if he should seen again in treatable causes of his symptoms are not found. All of his records from the [facility] rehab center that he had were reviewed, and his CT scans reviewed all personally on the disc he brought with him, and I agree with all reports, and diagnoses as dictated, and because of all of this his treatment and evaluation and further testing is as dictated above. Signed By: Treva West MD     February 14, 2023       CC: Doc Gauthier  FAX: [phone]        If you have questions, please do not hesitate to call me. I look forward to following your patient along with you.       Sincerely,    Treva West MD

## 2023-02-14 NOTE — PROGRESS NOTES
Chief Complaint   Patient presents with    Follow-up     S/p CVA - doing \"wonderful\"     1. Have you been to the ER, urgent care clinic since your last visit? Hospitalized since your last visit? No     2. Have you seen or consulted any other health care providers outside of the [address] [place] [address] since your last visit? Include any pap smears or colon screening.   Yes PCP

## 2023-02-15 NOTE — PROGRESS NOTES
Consult  REFERRED BY:  TIMBO Stone    CHIEF COMPLAINT: History of fall and passing out and head injury and traumatic subdural hematoma with persistent posttraumatic vertigo      Subjective:     Violet Magaña is a 65 y.o. right-handed  male, seen at the request of TIMBO Guardado for evaluation of new problem of patient having abnormal MRI of the brain last summer that showed some questionable new infarcts in the jonel and brainstem area, and a developmental venous anomaly of the brainstem, and prominent white matter disease and some question of having MS, so he had that repeated again September or October and that did not show any new strokes, but still suggested possible demyelinating disease, and had an MRI of the cervical spine done which showed no cord lesions or MS lesions, but did show moderate arthritis. He is brought back today to consider repeating his scans. We mention spinal tap last time but he did not want that because he is on anticoagulation and feels fine and his vertigo is all better and he wants to drive again if he feels probably back to normal, and besides his head trauma never really had much problem before that.   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, that occurred April 29, 2022, and then resting, and then getting up to go to the bathroom late at night, felt lightheaded and dizzy, and tried 3 times, and family just went to the bathroom and was voiding when he passed out and hit his head on the toilet and the floor and sustained a laceration to the occipital region associated with subdural hematomas along the tentorium and falx, that left him with confusion and needing inpatient hospitalization for about 2 weeks, and rehab at the [facility] rehab center where he was an inpatient for 1 week, and then his Workmen's Comp. cut off and he went home. He was supposed to follow-up with neurosurgery but never did, and does not follow-up with any neuro specialists so far. 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 He never required surgical intervention, but did require Xarelto as anticoagulation. He had a recent repeat CT scan of the head as follow-up June 23, 2022 that just showed prominent size ventricles likely central atrophy but no other structural lesion or evidence of hematoma or bleed. He was told he probably had a stroke, but then his other doctor told him he did not, and he is a little concerned. 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 He has no focal weakness or sensory loss and has no other neurologic deficit other than mainly that positional vertigo when he gets up or moves quickly or turns his head, and this has persisted though it is somewhat better, so we will send him to vestibular rehab for that. Because of his episode of micturition syncope, he had a normal ambulatory 24-hour EEG to rule out other causes for seizures as a cause of his passing out and today carotid Doppler study and imaging of the brain to rule out structural causes of his passing out episode he was referred to cardiology also for evaluation to make sure there is no other cardiac source.   Because of his clots and previous history of clots in his legs, he was sent to hematology to be evaluated to rule out clotting disorder in addition. He did not think he saw a hematologist and does not think he ever had an MRI of the brain done we need 1 of those to evaluate because of all of his neurologic symptoms. 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 He needs to stay on chronic anticoagulation. Past Medical History:   Diagnosis Date    Blood clot associated with vein wall inflammation 2014    Cerebral artery occlusion with cerebral infarction ([facility] [address]     Giant cell tumor     leg    Headache     Memory disorder Only after April 29. It is improving every day.     Migraine     Syncope and collapse       Past Surgical History:   Procedure Laterality Date    HX ANKLE FRACTURE TX Left 1997    HX HERNIA REPAIR  2010    HX HERNIA REPAIR  2011    HX TONSILLECTOMY  1975    HX TUMOR REMOVAL Left 1990    ankle - Giant Cell tumor removed     Family History   Problem Relation Age of Onset    Heart Disease Father     Stroke Father     Attention Deficit Disorder Sister     COPD Maternal Uncle     Alzheimer's Disease Paternal Aunt     Cancer Paternal Uncle     Diabetes Maternal Grandmother     Alcohol abuse Maternal Grandfather     Alcohol abuse Paternal Grandfather       Social History     Tobacco Use    Smoking status: Never    Smokeless tobacco: Never    Tobacco comments:     never used tobacco   Substance Use Topics    Alcohol use: Never         Current Outpatient Medications:     pitavastatin calcium (LIVALO) 2 mg tablet, , Disp: , Rfl:     LORazepam (ATIVAN) 0.5 mg tablet, 1 to 2 tablets p.o. 1 to 2 hours prior to MRI scan and during as needed, Disp: 5 Tablet, Rfl: 0    LORazepam (ATIVAN) 0.5 mg tablet, Take 1 to 2 tablets p.o. 1 to 2 hours prior to MRI scan and during MRI scan as needed for claustrophobia, Disp: 5 Tablet, Rfl: 0    butalbital-acetaminophen-caffeine (FIORICET, ESGIC) -40 mg per tablet, Take 1 Tablet by mouth., Disp: , Rfl:     Xarelto 20 mg tab tablet, TAKE 1 TABLET BY MOUTH EVERY DAY WITH THE EVENING MEAL, Disp: , Rfl:     cholo's wort 300 mg cap, , Disp: , Rfl:     SUMAtriptan (IMITREX) 100 mg tablet, take 1 tablet by oral route once with fluids as early as possible after the onset of a migraine - may repeat after 2 hr- max 200mg/day, Disp: , Rfl:     tamsulosin (FLOMAX) 0.4 mg capsule, Take 1 Capsule by mouth., Disp: , Rfl:     valACYclovir (VALTREX) 1 gram tablet, TAKE 1 TABLET BY MOUTH EVERY DAY AT ONSET OF SYMPTOMS FOR 5 DAYS, Disp: , Rfl:     aspirin (ASPIRIN) 325 mg tablet, Take 325 mg by mouth as needed. Very occassional, Disp: , Rfl:         Allergies   Allergen Reactions    Levofloxacin Shortness of Breath and Swelling    Atorvastatin Myalgia    Mold Itching    Sulfabenzamide Other (comments)    Amitriptyline Rash      MRI Results (most recent):  Results from Hospital Encounter encounter on 11/02/22    MRI CERV SPINE W WO CONT    Narrative  EXAM: MRI CERV SPINE W WO CONT  Clinical history: Evaluate for cervical cord demyelinating disease burden  INDICATION: R/O MS. Abnormal findings on diagnostic imaging of other specified  body structures    COMPARISON: Correlation with brain MRI 10/22/2022    TECHNIQUE: MR imaging of the cervical spine was performed using the following  sequences: sagittal T1, T2, STIR;  axial T2, T1 prior to and following contrast  administration. CONTRAST: ProHance    FINDINGS:    T2 hyperintense and enhancing foci at the pontomedullary junction  pontomesencephalic junction with abnormal increased T2 signal intensity focus in  the left cerebellum as well.  There is no evidence of cord signal abnormality in  the cervical cord allowing for technical limitations of exam. Marrow signal is  normal.  History of motion on the axial T2 star March sequence which slightly limits  sensitivity for detection of abnormal cord signal foci. The craniocervical junction is intact. There is trace retrolisthesis of C3 on  C4. There is no pathologic intrathecal enhancement. The paraspinal soft tissues are within normal limits. C2-C3: No herniation or stenosis. C3-C4: Disc desiccation and loss of disc height. Mild facet arthropathy. Circumferential bulge/osteophyte. Mild to moderate canal and moderate bilateral  foraminal stenoses. C4-C5: Disc desiccation. Disc bulge/osteophyte. Mild canal and left foraminal  stenosis. C5-C6: Disc desiccation. Disc protrusion/osteophyte. Mild canal stenosis. Foramina are patent    C6-C7: Disc desiccation. Mild facet arthropathy. Right foraminal  protrusion/osteophyte. The canal is patent. There is severe right and moderate  to severe left foraminal stenosis. C7-T1: No herniation or stenosis. Central disc protrusion at T2-T3. No canal or foraminal compromise. Impression  There is no definitive cervical cord demyelinating disease burden. Abnormal FLAIR signal intensity and postcontrast enhancement identified in the  jonel is not significantly changed as seen on brain MRI of 10/22/2022. Multilevel disc and facet degenerative change of the cervical spine. There is mild to moderate canal and moderate bilateral foraminal stenosis at  C3-4. Severe right and moderate to severe left foraminal stenosis at C6-7. Additional, less severe degenerative findings are as described in detail above. Results from Hospital Encounter encounter on 11/02/22    MRI CERV SPINE W WO CONT    Narrative  EXAM: MRI CERV SPINE W WO CONT  Clinical history: Evaluate for cervical cord demyelinating disease burden  INDICATION: R/O MS.  Abnormal findings on diagnostic imaging of other specified  body structures    COMPARISON: Correlation with brain MRI 10/22/2022    TECHNIQUE: MR imaging of the cervical spine was performed using the following  sequences: sagittal T1, T2, STIR;  axial T2, T1 prior to and following contrast  administration. CONTRAST: ProHance    FINDINGS:    T2 hyperintense and enhancing foci at the pontomedullary junction  pontomesencephalic junction with abnormal increased T2 signal intensity focus in  the left cerebellum as well. There is no evidence of cord signal abnormality in  the cervical cord allowing for technical limitations of exam. Marrow signal is  normal.  History of motion on the axial T2 star March sequence which slightly limits  sensitivity for detection of abnormal cord signal foci. The craniocervical junction is intact. There is trace retrolisthesis of C3 on  C4. There is no pathologic intrathecal enhancement. The paraspinal soft tissues are within normal limits. C2-C3: No herniation or stenosis. C3-C4: Disc desiccation and loss of disc height. Mild facet arthropathy. Circumferential bulge/osteophyte. Mild to moderate canal and moderate bilateral  foraminal stenoses. C4-C5: Disc desiccation. Disc bulge/osteophyte. Mild canal and left foraminal  stenosis. C5-C6: Disc desiccation. Disc protrusion/osteophyte. Mild canal stenosis. Foramina are patent    C6-C7: Disc desiccation. Mild facet arthropathy. Right foraminal  protrusion/osteophyte. The canal is patent. There is severe right and moderate  to severe left foraminal stenosis. C7-T1: No herniation or stenosis. Central disc protrusion at T2-T3. No canal or foraminal compromise. Impression  There is no definitive cervical cord demyelinating disease burden. Abnormal FLAIR signal intensity and postcontrast enhancement identified in the  jonel is not significantly changed as seen on brain MRI of 10/22/2022.     Multilevel disc and facet degenerative change of the cervical spine. There is mild to moderate canal and moderate bilateral foraminal stenosis at  C3-4. Severe right and moderate to severe left foraminal stenosis at C6-7. Additional, less severe degenerative findings are as described in detail above. Review of Systems:  A comprehensive review of systems was negative except for: Constitutional: positive for fatigue and malaise  Ears, nose, mouth, throat, and face: positive for Postconcussive vertigo  Cardiovascular: positive for near-syncope, syncope, dizziness  Musculoskeletal: positive for myalgias, arthralgias and stiff joints  Neurological: positive for headaches, dizziness, vertigo, memory problems and coordination problems  Behvioral/Psych: positive for anxiety and depression   Vitals:    02/14/23 1330   BP: 130/80   Pulse: (!) 103   Resp: 18   Temp: 97.9 °F (36.6 °C)   TempSrc: Temporal   SpO2: 97%   Weight: 221 lb 9.6 oz (100.5 kg)   Height: 6' (1.829 m)     Objective:     I      NEUROLOGICAL EXAM:    Appearance: The patient is well developed, well nourished, provides a coherent history and is in no acute distress. Mental Status: Oriented to time, place and person, and the president, cognitive function is normal and speech is fluent and no aphasia or dysarthria. Mood and affect appropriate. Cranial Nerves:   Intact visual fields. Fundi are benign, disc are flat, no lesions seen on funduscopy. SUDHEER, EOM's full, no nystagmus, no ptosis. Facial sensation is normal. Corneal reflexes are not tested. Facial movement is symmetric. Hearing is normal bilaterally. Palate is midline with normal sternocleidomastoid and trapezius muscles are normal. Tongue is midline. Neck without meningismus or bruits  Temporal arteries are not tender or enlarged  TMJ areas are not tender on palpation   Motor:  5/5 strength in upper and lower proximal and distal muscles. Normal bulk and tone. No fasciculations.   Rapid alternating movement is symmetric and intact bilaterally   Reflexes:   Deep tendon reflexes 2+/4 and symmetrical.  No babinski or clonus present  Patient developed positional vertigo on Hallpike Josey maneuver. Sensory:   Normal to touch, pinprick and vibration and temperature. DSS is intact   Gait:  Normal gait for patient's age. Tremor:   No tremor noted. Cerebellar:   Mildly abnormal cerebellar signs present on Romberg and tandem testing and finger-nose-finger exam.   Neurovascular:  Normal heart sounds and regular rhythm, peripheral pulses intact, and no carotid bruits. Assessment:       ICD-10-CM ICD-9-CM    1. Bilateral carotid artery stenosis  I65.23 433.10 MRI BRAIN W WO CONT     433.30 LORazepam (ATIVAN) 0.5 mg tablet      2. Cerebral microvascular disease  I67.89 437.8 MRI BRAIN W WO CONT      LORazepam (ATIVAN) 0.5 mg tablet      3. Migraine without aura and without status migrainosus, not intractable  G43.009 346.10 MRI BRAIN W WO CONT      LORazepam (ATIVAN) 0.5 mg tablet      4. Demyelinating disease of central nervous system (HCC)  G37.9 341.9 MRI BRAIN W WO CONT      LORazepam (ATIVAN) 0.5 mg tablet        Active Problems:    * No active hospital problems. *      Plan:     Patient with complicated problem of micturition syncope possibly in loss of consciousness associated with traumatic subdural hematomas and head injury and postconcussive syndrome and postconcussive vertigo. He went to vestibular therapy and improved significantly and has no more vertigo and wants to drive. He had an MRI of the brain that showed some questionable new strokes in August 2022, and had that repeated again in October which showed no new strokes but did suggest white matter lesions of the brain could be considered consistent with MS possibly, he did not want a spinal tap so we will repeat his MRI today to follow this, to see whether he needs 1, his MRI of the cervical spine just showed arthritis but no white matter lesions of the cord.   He has never had previous neurological problems till this fall. He has a clotting disorder of the prothrombin gene has to take chronic anticoagulation rest of his life. For his syncope he had a normal carotid Doppler and a normal ambulatory 24-hour EEG supposedly normal cardiac evaluation. He will be referred to hematology for hypercoagulable work-up because of his generalized history of venous thrombosis and hypercoagulable syndrome  We advised the patient that the single most important thing is not to hit his head again. Repeated head injuries, causes traumatic encephalopathies. Because of his syncope he was referred to cardiology for further evaluation to rule out cardiac cause advised not to drive for 6-month until we make sure he is symptom-free. If his MRI of the brain is abnormal we may need to repeat the cervical 1 2. Further treatment evaluation will depend results of his clinical course and response to therapy and other needs, he will call us immediately if any problem or questions about his condition or tests  38 minutes spent with the patient going over all this in detail, he is obvious he is at a high risk for further complications because of his clotting disorder and syncopal episode and further neurologic injury and dysfunction if he should seen again in treatable causes of his symptoms are not found. All of his records from the [facility] rehab center that he had were reviewed, and his CT scans reviewed all personally on the disc he brought with him, and I agree with all reports, and diagnoses as dictated, and because of all of this his treatment and evaluation and further testing is as dictated above. Signed By: Liz Nelson MD     February 14, 2023       CC:  Adams Boyle, [address]  FAX: [phone]

## 2023-03-03 ENCOUNTER — HOSPITAL ENCOUNTER (OUTPATIENT)
Dept: MRI IMAGING | Age: 58
Discharge: HOME | End: 2023-03-03
Attending: PSYCHIATRY & NEUROLOGY
Payer: MEDICAID

## 2023-03-03 DIAGNOSIS — I65.23 BILATERAL CAROTID ARTERY STENOSIS: ICD-10-CM

## 2023-03-03 DIAGNOSIS — G43.009 MIGRAINE WITHOUT AURA AND WITHOUT STATUS MIGRAINOSUS, NOT INTRACTABLE: ICD-10-CM

## 2023-03-03 DIAGNOSIS — G37.9 DEMYELINATING DISEASE OF CENTRAL NERVOUS SYSTEM (HCC): ICD-10-CM

## 2023-03-03 DIAGNOSIS — I67.89 CEREBRAL MICROVASCULAR DISEASE: ICD-10-CM

## 2023-03-03 PROCEDURE — A9576 INJ PROHANCE MULTIPACK: HCPCS | Performed by: PSYCHIATRY & NEUROLOGY

## 2023-03-03 PROCEDURE — 70553 MRI BRAIN STEM W/O & W/DYE: CPT

## 2023-03-03 PROCEDURE — 74011250636 HC RX REV CODE- 250/636: Performed by: PSYCHIATRY & NEUROLOGY

## 2023-03-03 RX ADMIN — GADOTERIDOL 20 ML: 279.3 INJECTION, SOLUTION INTRAVENOUS at 17:04

## 2023-03-07 ENCOUNTER — DOCUMENTATION ONLY (OUTPATIENT)
Dept: NEUROLOGY | Age: 58
End: 2023-03-07

## 2023-03-08 NOTE — PROGRESS NOTES
I called the patient, and discussed with him the findings on the MRI that showed that he had developmental venous anomalies in the brain and that seems to be the lesions we were worried about, and they have not changed at all, and he does have the large ventricles, but he has no complaints of headache, shuffling gait, bladder control problems, or memory loss, and does not think he has any brain problem at this time. Since he does not fit the symptoms of normal pressure hydrocephalus he does not want a referral down to [facility] because he does not want brain surgery for something he does not feel he has.   We will follow him serially at 6 months to a year and watch for any signs that he might have we told him to call us if he has any balance problems, bladder control problems, shuffling gait, or memory loss or headaches and he said he would

## 2023-05-24 RX ORDER — VALACYCLOVIR HYDROCHLORIDE 1 G/1
TABLET, FILM COATED ORAL
COMMUNITY
Start: 2022-06-24

## 2023-05-24 RX ORDER — LORAZEPAM 0.5 MG/1
TABLET ORAL
COMMUNITY
Start: 2022-11-04

## 2023-05-24 RX ORDER — ASPIRIN 325 MG
325 TABLET ORAL PRN
COMMUNITY

## 2023-05-24 RX ORDER — TAMSULOSIN HYDROCHLORIDE 0.4 MG/1
1 CAPSULE ORAL
COMMUNITY
Start: 2021-11-01

## 2023-05-24 RX ORDER — SUMATRIPTAN 100 MG/1
TABLET, FILM COATED ORAL
COMMUNITY
Start: 2021-11-01

## 2023-05-24 RX ORDER — BUTALBITAL, ACETAMINOPHEN AND CAFFEINE 50; 325; 40 MG/1; MG/1; MG/1
1 TABLET ORAL
COMMUNITY
Start: 2021-11-01

## 2023-11-27 ENCOUNTER — OFFICE VISIT (OUTPATIENT)
Age: 58
End: 2023-11-27
Payer: COMMERCIAL

## 2023-11-27 VITALS
DIASTOLIC BLOOD PRESSURE: 82 MMHG | WEIGHT: 220.2 LBS | SYSTOLIC BLOOD PRESSURE: 134 MMHG | RESPIRATION RATE: 16 BRPM | TEMPERATURE: 97.6 F | OXYGEN SATURATION: 98 % | BODY MASS INDEX: 29.82 KG/M2 | HEART RATE: 96 BPM | HEIGHT: 72 IN

## 2023-11-27 DIAGNOSIS — G43.009 MIGRAINE WITHOUT AURA AND WITHOUT STATUS MIGRAINOSUS, NOT INTRACTABLE: ICD-10-CM

## 2023-11-27 DIAGNOSIS — E03.4 HYPOTHYROIDISM DUE TO ACQUIRED ATROPHY OF THYROID: ICD-10-CM

## 2023-11-27 DIAGNOSIS — E53.8 B12 DEFICIENCY: ICD-10-CM

## 2023-11-27 DIAGNOSIS — E55.9 VITAMIN D DEFICIENCY: ICD-10-CM

## 2023-11-27 DIAGNOSIS — F90.2 ATTENTION DEFICIT HYPERACTIVITY DISORDER (ADHD), COMBINED TYPE: ICD-10-CM

## 2023-11-27 DIAGNOSIS — R41.82 ACUTE ALTERATION IN MENTAL STATUS: ICD-10-CM

## 2023-11-27 DIAGNOSIS — R41.3 DISTURBANCE OF MEMORY: ICD-10-CM

## 2023-11-27 DIAGNOSIS — F07.81 POST CONCUSSION SYNDROME: ICD-10-CM

## 2023-11-27 DIAGNOSIS — I65.23 BILATERAL CAROTID ARTERY STENOSIS: ICD-10-CM

## 2023-11-27 DIAGNOSIS — G25.81 RESTLESS LEG SYNDROME: ICD-10-CM

## 2023-11-27 DIAGNOSIS — G91.2 NPH (NORMAL PRESSURE HYDROCEPHALUS) (HCC): ICD-10-CM

## 2023-11-27 DIAGNOSIS — I67.89 CEREBRAL MICROVASCULAR DISEASE: ICD-10-CM

## 2023-11-27 DIAGNOSIS — E78.2 MODERATE MIXED HYPERLIPIDEMIA NOT REQUIRING STATIN THERAPY: Primary | ICD-10-CM

## 2023-11-27 DIAGNOSIS — G37.9 DEMYELINATING DISEASE OF CENTRAL NERVOUS SYSTEM (HCC): ICD-10-CM

## 2023-11-27 PROCEDURE — 99214 OFFICE O/P EST MOD 30 MIN: CPT | Performed by: PSYCHIATRY & NEUROLOGY

## 2023-11-27 RX ORDER — ESCITALOPRAM OXALATE 10 MG/1
10 TABLET ORAL
Qty: 30 TABLET | Refills: 11 | Status: SHIPPED | OUTPATIENT
Start: 2023-11-27

## 2023-11-27 RX ORDER — ACETAMINOPHEN 650 MG/1
650 SUPPOSITORY RECTAL EVERY 4 HOURS PRN
COMMUNITY

## 2023-11-27 RX ORDER — EZETIMIBE 10 MG/1
10 TABLET ORAL DAILY
COMMUNITY

## 2023-11-27 ASSESSMENT — PATIENT HEALTH QUESTIONNAIRE - PHQ9
SUM OF ALL RESPONSES TO PHQ QUESTIONS 1-9: 2
SUM OF ALL RESPONSES TO PHQ QUESTIONS 1-9: 2
SUM OF ALL RESPONSES TO PHQ9 QUESTIONS 1 & 2: 2
SUM OF ALL RESPONSES TO PHQ QUESTIONS 1-9: 2
1. LITTLE INTEREST OR PLEASURE IN DOING THINGS: 1
2. FEELING DOWN, DEPRESSED OR HOPELESS: 1
SUM OF ALL RESPONSES TO PHQ QUESTIONS 1-9: 2

## 2023-11-28 LAB
25(OH)D3 SERPL-MCNC: 32.2 NG/ML (ref 30–100)
FOLATE SERPL-MCNC: 14.4 NG/ML (ref 5–21)
TSH SERPL DL<=0.05 MIU/L-ACNC: 1.9 UIU/ML (ref 0.36–3.74)
VIT B12 SERPL-MCNC: 318 PG/ML (ref 193–986)

## 2023-11-28 NOTE — PROGRESS NOTES
Consult  REFERRED BY:  Trevor Zamora PA-C    CHIEF COMPLAINT: New problem of behavioral problems, memory problems, and possible ADHD, and possible dementia, and possible as a sequelae of his posttraumatic brain injury. Subjective:     Manan Kerr is a 63 y. o.right-handed  male, seen at the request of WILLIAM Ledesma for evaluation of new problem of patient having some progressive memory loss, that he says seems to be more intermediate memory loss, with his recent memory being okay, and his long-term memory being okay, and he is having more behavioral issues and aggressive behavior toward his wife his wife is convinced that he has ADHD in addition. This all seems to happen about the time of his head injury and initially he thought he was getting better but his wife is clearly says he is not getting better getting worse, so we will send him for neuropsych testing to evaluate for ADHD, posttraumatic encephalopathy, or primary dementia. He does have the recent finding of an abnormal MRI suggesting hydrocephalus, possibly normal pressure hydrocephalus and that may be a possibility in addition to his white matter disease of the brain. Patient seen with abnormal MRI of the brain in mid 2022 that showed some questionable new infarcts in the fernanda and brainstem area, and a developmental venous anomaly of the brainstem, and prominent white matter disease and some question of having MS, so he had that repeated again September or October and that did not show any new strokes, but still suggested possible demyelinating disease, and had an MRI of the cervical spine done which showed no cord lesions or MS lesions, but did show moderate arthritis. He is brought back today to consider repeating his scans.   The patient agrees to repeating his scans today so we will do that his brain and neck, to rule out any signs of demyelinating disease and to rule out any signs of progressive hydrocephalus in view of his

## 2024-01-07 ENCOUNTER — HOSPITAL ENCOUNTER (OUTPATIENT)
Facility: HOSPITAL | Age: 59
Discharge: HOME | End: 2024-01-10
Attending: PSYCHIATRY & NEUROLOGY
Payer: MEDICAID

## 2024-01-07 DIAGNOSIS — G25.81 RESTLESS LEG SYNDROME: ICD-10-CM

## 2024-01-07 DIAGNOSIS — G37.9 DEMYELINATING DISEASE OF CENTRAL NERVOUS SYSTEM (HCC): ICD-10-CM

## 2024-01-07 PROCEDURE — 6360000004 HC RX CONTRAST MEDICATION: Performed by: PSYCHIATRY & NEUROLOGY

## 2024-01-07 PROCEDURE — A9579 GAD-BASE MR CONTRAST NOS,1ML: HCPCS | Performed by: PSYCHIATRY & NEUROLOGY

## 2024-01-07 PROCEDURE — 72156 MRI NECK SPINE W/O & W/DYE: CPT

## 2024-01-07 RX ADMIN — GADOTERIDOL 20 ML: 279.3 INJECTION, SOLUTION INTRAVENOUS at 16:10

## 2024-01-08 NOTE — PROGRESS NOTES
I called the patient, let him know the MRI scan was showing moderate arthritis but no demyelinating disease which is good news, and the arthritis is not surgical, so he will continue exercise program for his neck.

## 2024-01-14 ENCOUNTER — HOSPITAL ENCOUNTER (OUTPATIENT)
Facility: HOSPITAL | Age: 59
Discharge: HOME | End: 2024-01-17
Attending: PSYCHIATRY & NEUROLOGY
Payer: MEDICAID

## 2024-01-14 DIAGNOSIS — I67.89 CEREBRAL MICROVASCULAR DISEASE: ICD-10-CM

## 2024-01-14 DIAGNOSIS — I65.23 BILATERAL CAROTID ARTERY STENOSIS: ICD-10-CM

## 2024-01-14 DIAGNOSIS — G43.009 MIGRAINE WITHOUT AURA AND WITHOUT STATUS MIGRAINOSUS, NOT INTRACTABLE: ICD-10-CM

## 2024-01-14 DIAGNOSIS — G37.9 DEMYELINATING DISEASE OF CENTRAL NERVOUS SYSTEM (HCC): ICD-10-CM

## 2024-01-14 DIAGNOSIS — R41.3 DISTURBANCE OF MEMORY: ICD-10-CM

## 2024-01-14 DIAGNOSIS — F90.2 ATTENTION DEFICIT HYPERACTIVITY DISORDER (ADHD), COMBINED TYPE: ICD-10-CM

## 2024-01-14 DIAGNOSIS — G91.2 NPH (NORMAL PRESSURE HYDROCEPHALUS) (HCC): ICD-10-CM

## 2024-01-14 DIAGNOSIS — F07.81 POST CONCUSSION SYNDROME: ICD-10-CM

## 2024-01-14 PROCEDURE — A9579 GAD-BASE MR CONTRAST NOS,1ML: HCPCS | Performed by: PSYCHIATRY & NEUROLOGY

## 2024-01-14 PROCEDURE — 70553 MRI BRAIN STEM W/O & W/DYE: CPT

## 2024-01-14 PROCEDURE — 6360000004 HC RX CONTRAST MEDICATION: Performed by: PSYCHIATRY & NEUROLOGY

## 2024-01-14 RX ADMIN — GADOTERIDOL 20 ML: 279.3 INJECTION, SOLUTION INTRAVENOUS at 08:40

## 2024-01-15 ENCOUNTER — CLINICAL DOCUMENTATION (OUTPATIENT)
Age: 59
End: 2024-01-15

## 2024-01-15 NOTE — PROGRESS NOTES
I called the patient, let them know the MRI scan was stable, hydrocephalus stable, no progression of disease, we will continue current therapy and not consider MCV because she feels she is stable neuropsych testing does not show any major problem either.

## 2024-03-13 ENCOUNTER — TELEPHONE (OUTPATIENT)
Age: 59
End: 2024-03-13

## 2024-05-28 ENCOUNTER — OFFICE VISIT (OUTPATIENT)
Age: 59
End: 2024-05-28
Payer: COMMERCIAL

## 2024-05-28 VITALS
OXYGEN SATURATION: 97 % | WEIGHT: 212.3 LBS | HEIGHT: 72 IN | SYSTOLIC BLOOD PRESSURE: 122 MMHG | RESPIRATION RATE: 17 BRPM | DIASTOLIC BLOOD PRESSURE: 84 MMHG | BODY MASS INDEX: 28.76 KG/M2 | TEMPERATURE: 97.6 F | HEART RATE: 94 BPM

## 2024-05-28 DIAGNOSIS — R55 CONVULSIVE SYNCOPE: ICD-10-CM

## 2024-05-28 DIAGNOSIS — I67.89 CEREBRAL MICROVASCULAR DISEASE: ICD-10-CM

## 2024-05-28 DIAGNOSIS — R41.3 DISTURBANCE OF MEMORY: ICD-10-CM

## 2024-05-28 DIAGNOSIS — F07.81 POST-CONCUSSION VERTIGO: ICD-10-CM

## 2024-05-28 DIAGNOSIS — G91.2 NPH (NORMAL PRESSURE HYDROCEPHALUS) (HCC): ICD-10-CM

## 2024-05-28 DIAGNOSIS — R42 POST-CONCUSSION VERTIGO: ICD-10-CM

## 2024-05-28 DIAGNOSIS — R53.1 GENERALIZED WEAKNESS: ICD-10-CM

## 2024-05-28 DIAGNOSIS — F90.2 ATTENTION DEFICIT HYPERACTIVITY DISORDER (ADHD), COMBINED TYPE: ICD-10-CM

## 2024-05-28 DIAGNOSIS — G43.009 MIGRAINE WITHOUT AURA AND WITHOUT STATUS MIGRAINOSUS, NOT INTRACTABLE: ICD-10-CM

## 2024-05-28 DIAGNOSIS — I65.23 BILATERAL CAROTID ARTERY STENOSIS: Primary | ICD-10-CM

## 2024-05-28 PROCEDURE — 99214 OFFICE O/P EST MOD 30 MIN: CPT | Performed by: PSYCHIATRY & NEUROLOGY

## 2024-05-28 ASSESSMENT — PATIENT HEALTH QUESTIONNAIRE - PHQ9
SUM OF ALL RESPONSES TO PHQ9 QUESTIONS 1 & 2: 0
2. FEELING DOWN, DEPRESSED OR HOPELESS: NOT AT ALL
SUM OF ALL RESPONSES TO PHQ QUESTIONS 1-9: 0
1. LITTLE INTEREST OR PLEASURE IN DOING THINGS: NOT AT ALL

## 2024-05-28 NOTE — PROGRESS NOTES
Consult  REFERRED BY:  Behzad Villarreal PA-C    CHIEF COMPLAINT: New problem of having periods of intermittent weakness, oftentimes at nighttime getting out of bed, feeling every heavy sensation over his whole body, 1 time could get his left leg to move for 5 seconds, and concerned about having ALS because his mother had ALS, and seen for follow-up of behavioral problems, memory problems, and possible ADHD, and possible dementia, and possible as a sequelae of his posttraumatic brain injury.      Subjective:     Karlo Brown is a 58 y.o.right-handed  male, seen at the request of WILLIAM Pretty for evaluation of new problem of patient having some problem of episodes of weakness that occur episodically, sometimes at nighttime getting out of bed feels a generalized weakness and heaviness in his muscles, and 1 time he could not move his left leg for 5 seconds, and another time he seems to have 3 episodes at work not being able to do what he could before, it nothing seems to persist he has no atrophy or fasciculations noted no bulbar or cranial nerve symptoms.  His exam today is relatively normal, I do not appreciate any atrophy or fasciculations of any of his muscles and certainly no weakness looks like ALS, and in view of his relatively normal exam, normal cranial nerve exam, we will just follow this with I do not think he has any evidence of ALS.  If that changes we can always do an EMG in the future.  He is not exercising that much because of his shoulder problem and advised him he probably should get back on that.  His memory has miraculously gotten better in November just seem to go away and never got his neuropsych testing his MRI of the brain in January 2024 that showed a stable enlarged ventricles and mild hydrocephalus and some white matter lesions, but no other lesions noted.  Patient seen with abnormal MRI of the brain in mid 2022 that showed some questionable new infarcts in the fernanda and brainstem

## 2025-01-29 ENCOUNTER — OFFICE VISIT (OUTPATIENT)
Age: 60
End: 2025-01-29
Payer: COMMERCIAL

## 2025-01-29 VITALS
HEIGHT: 72 IN | WEIGHT: 222.6 LBS | TEMPERATURE: 98.1 F | SYSTOLIC BLOOD PRESSURE: 122 MMHG | OXYGEN SATURATION: 95 % | DIASTOLIC BLOOD PRESSURE: 66 MMHG | RESPIRATION RATE: 18 BRPM | HEART RATE: 77 BPM | BODY MASS INDEX: 30.15 KG/M2

## 2025-01-29 DIAGNOSIS — R41.3 DISTURBANCE OF MEMORY: ICD-10-CM

## 2025-01-29 DIAGNOSIS — G25.81 RESTLESS LEG SYNDROME: ICD-10-CM

## 2025-01-29 DIAGNOSIS — I65.23 BILATERAL CAROTID ARTERY STENOSIS: Primary | ICD-10-CM

## 2025-01-29 DIAGNOSIS — F90.2 ATTENTION DEFICIT HYPERACTIVITY DISORDER (ADHD), COMBINED TYPE: ICD-10-CM

## 2025-01-29 DIAGNOSIS — I67.89 CEREBRAL MICROVASCULAR DISEASE: ICD-10-CM

## 2025-01-29 DIAGNOSIS — G43.009 MIGRAINE WITHOUT AURA AND WITHOUT STATUS MIGRAINOSUS, NOT INTRACTABLE: ICD-10-CM

## 2025-01-29 DIAGNOSIS — G37.9 DEMYELINATING DISEASE OF CENTRAL NERVOUS SYSTEM (HCC): ICD-10-CM

## 2025-01-29 DIAGNOSIS — G91.2 NPH (NORMAL PRESSURE HYDROCEPHALUS) (HCC): ICD-10-CM

## 2025-01-29 PROCEDURE — 99213 OFFICE O/P EST LOW 20 MIN: CPT | Performed by: PSYCHIATRY & NEUROLOGY

## 2025-01-29 RX ORDER — TADALAFIL 5 MG/1
TABLET ORAL
COMMUNITY
Start: 2025-01-27

## 2025-01-29 ASSESSMENT — PATIENT HEALTH QUESTIONNAIRE - PHQ9
SUM OF ALL RESPONSES TO PHQ QUESTIONS 1-9: 0
1. LITTLE INTEREST OR PLEASURE IN DOING THINGS: NOT AT ALL
SUM OF ALL RESPONSES TO PHQ QUESTIONS 1-9: 0
SUM OF ALL RESPONSES TO PHQ QUESTIONS 1-9: 0
SUM OF ALL RESPONSES TO PHQ9 QUESTIONS 1 & 2: 0
SUM OF ALL RESPONSES TO PHQ QUESTIONS 1-9: 0
2. FEELING DOWN, DEPRESSED OR HOPELESS: NOT AT ALL

## 2025-01-29 NOTE — PROGRESS NOTES
([facility])        4. Disturbance of memory        5. Attention deficit hyperactivity disorder (ADHD), combined type        6. Migraine without aura and without status migrainosus, not intractable        7. NPH (normal pressure hydrocephalus) ([facility])        8. Restless leg syndrome          Active Problems:    * No active hospital problems. *  Resolved Problems:    * No resolved hospital problems. *      Plan:     Patient seen for follow-up of his weakness is all resolved, he never got a CPK level last visit in May 2024  His memory is back to normal and his strength is back to normal and he is working normally and has no limitations, no complaints no headaches no weakness no numbness and is doing well.  He may just have a component of posttraumatic memory loss that has persisted.    Patient has chronic hydrocephalus on his MRI scans as remains unchanged and some mild apparent vascular lesions in the brainstem that looks stable for the last 3 years, we will repeat his scan next year for routine follow-up.  He is on chronic anticoagulation because of his deep venous thrombosis  Patient with complicated problem of micturition syncope possibly in loss of consciousness associated with traumatic subdural hematomas and head injury and postconcussive syndrome and postconcussive vertigo.  He went to vestibular therapy and improved significantly and has no more vertigo and wants to drive.    He had an MRI of the brain that showed some questionable new strokes in August 2022, and had that repeated again in October which showed no new strokes but did suggest white matter lesions of the brain could be considered consistent with MS possibly, he did not want a spinal tap so we will repeat his MRI today to follow this, to see whether he needs 1, his MRI of the cervical spine just showed arthritis but no white matter lesions of the cord.  He has a clotting disorder of the prothrombin gene has to take chronic anticoagulation rest of his

## 2025-04-14 ENCOUNTER — HOSPITAL ENCOUNTER (OUTPATIENT)
Facility: HOSPITAL | Age: 60
Setting detail: SPECIMEN
Discharge: HOME | End: 2025-04-17

## 2025-04-14 PROCEDURE — 86704 HEP B CORE ANTIBODY TOTAL: CPT

## 2025-04-14 PROCEDURE — 87389 HIV-1 AG W/HIV-1&-2 AB AG IA: CPT

## 2025-04-14 PROCEDURE — 86803 HEPATITIS C AB TEST: CPT

## 2025-04-14 PROCEDURE — 86706 HEP B SURFACE ANTIBODY: CPT

## 2025-04-14 PROCEDURE — 87340 HEPATITIS B SURFACE AG IA: CPT

## 2025-05-28 PROCEDURE — 86803 HEPATITIS C AB TEST: CPT

## 2025-05-28 PROCEDURE — 84460 ALANINE AMINO (ALT) (SGPT): CPT

## 2025-05-28 PROCEDURE — 87389 HIV-1 AG W/HIV-1&-2 AB AG IA: CPT

## 2025-05-29 ENCOUNTER — HOSPITAL ENCOUNTER (OUTPATIENT)
Facility: HOSPITAL | Age: 60
Setting detail: SPECIMEN
Discharge: HOME | End: 2025-06-01

## 2025-05-29 LAB
ALT SERPL-CCNC: 26 U/L (ref 12–78)
HCV AB SER IA-ACNC: 0.1 INDEX
HCV AB SERPL QL IA: NONREACTIVE
HIV 1+2 AB+HIV1 P24 AG SERPL QL IA: NONREACTIVE
HIV 1/2 RESULT COMMENT: NORMAL